# Patient Record
Sex: FEMALE | Race: WHITE | NOT HISPANIC OR LATINO
[De-identification: names, ages, dates, MRNs, and addresses within clinical notes are randomized per-mention and may not be internally consistent; named-entity substitution may affect disease eponyms.]

---

## 2022-03-24 PROBLEM — Z00.00 ENCOUNTER FOR PREVENTIVE HEALTH EXAMINATION: Status: ACTIVE | Noted: 2022-03-24

## 2022-04-01 ENCOUNTER — APPOINTMENT (OUTPATIENT)
Dept: CARDIOLOGY | Facility: CLINIC | Age: 77
End: 2022-04-01

## 2022-08-05 ENCOUNTER — RESULT REVIEW (OUTPATIENT)
Age: 77
End: 2022-08-05

## 2022-08-05 ENCOUNTER — OUTPATIENT (OUTPATIENT)
Dept: OUTPATIENT SERVICES | Facility: HOSPITAL | Age: 77
LOS: 1 days | End: 2022-08-05
Payer: MEDICARE

## 2022-08-05 ENCOUNTER — APPOINTMENT (OUTPATIENT)
Dept: ORTHOPEDIC SURGERY | Facility: CLINIC | Age: 77
End: 2022-08-05

## 2022-08-05 VITALS
BODY MASS INDEX: 34.15 KG/M2 | HEIGHT: 64 IN | HEART RATE: 74 BPM | SYSTOLIC BLOOD PRESSURE: 161 MMHG | DIASTOLIC BLOOD PRESSURE: 91 MMHG | OXYGEN SATURATION: 98 % | WEIGHT: 200 LBS

## 2022-08-05 DIAGNOSIS — Z86.79 PERSONAL HISTORY OF OTHER DISEASES OF THE CIRCULATORY SYSTEM: ICD-10-CM

## 2022-08-05 DIAGNOSIS — Z86.39 PERSONAL HISTORY OF OTHER ENDOCRINE, NUTRITIONAL AND METABOLIC DISEASE: ICD-10-CM

## 2022-08-05 PROCEDURE — 73564 X-RAY EXAM KNEE 4 OR MORE: CPT | Mod: 26,50

## 2022-08-05 PROCEDURE — 72170 X-RAY EXAM OF PELVIS: CPT | Mod: 26

## 2022-08-05 PROCEDURE — 72170 X-RAY EXAM OF PELVIS: CPT

## 2022-08-05 PROCEDURE — 73564 X-RAY EXAM KNEE 4 OR MORE: CPT

## 2022-08-05 PROCEDURE — 99204 OFFICE O/P NEW MOD 45 MIN: CPT | Mod: 25

## 2022-08-05 PROCEDURE — 20610 DRAIN/INJ JOINT/BURSA W/O US: CPT

## 2022-08-05 RX ORDER — MELOXICAM 7.5 MG/1
7.5 TABLET ORAL DAILY
Qty: 30 | Refills: 2 | Status: ACTIVE | COMMUNITY
Start: 2022-08-05 | End: 1900-01-01

## 2022-08-08 PROBLEM — Z86.39 HISTORY OF HIGH CHOLESTEROL: Status: RESOLVED | Noted: 2022-08-05 | Resolved: 2022-08-08

## 2022-08-08 PROBLEM — Z86.79 HISTORY OF HYPERTENSION: Status: RESOLVED | Noted: 2022-08-05 | Resolved: 2022-08-08

## 2022-08-08 NOTE — DISCUSSION/SUMMARY
[de-identified] : 77 y/o female with bilateral knee osteoarthritis\par - She would benefit from staged bilateral total knee arthroplasty, starting with the more symptomatic right side first\par - We discussed the details of the procedure, the expected recovery period, and the expected outcome. We discussed the likelihood of satisfaction after complete recovery, and the potential causes of dissatisfaction. The importance of active patient participation in the rehabilitation protocol was emphasized, along with its influence on short and long-term outcomes. Specific risks of total knee replacement were discussed in detail. We discussed the risk of surgical site complications including but not limited to: surgical site infection, wound healing complications, bone fracture, tendon or ligament injury, neurovascular injury, hemorrhage, postoperative stiffness or instability, persistent pain and need for reoperation or manipulation under anesthesia. We discussed surgical blood loss and the possible need for blood transfusion. We discussed the risk of perioperative medical complications, including but not limited to catheter-associated urinary tract infection, venous thromboembolism and other cardiopulmonary complications. We discussed anesthetic options and the risk of anesthesia-related complications. We discussed implant fixation methods; my plan would be to use fully cemented fixation in this case. We discussed the variable need to resurface the patella; my plan would be to resurface in this case. We discussed the durability of prosthetic knees and limitations related to wear, osteolysis and loosening.  We discussed the role of the robot in helping to guide bone resections and measure alignment and soft tissue balance. All questions were answered the patient's satisfaction. Her daughter also had the opportunity to listen in and all her questions were likewise answered to her satisfaction.\par - After full discussion, she elected to defer surgery for now and instead opted toward conservative management for the time being\par - PT\par - HEP\par - Right knee aspiration and CSI administered today\par - Right knee lateral off  brace\par - Tylenol and meloxicam as needed\par - RTC 3mo, no new XRs needed

## 2022-08-08 NOTE — HISTORY OF PRESENT ILLNESS
[de-identified] : 8/5/22: 75 y/o Guinean speaking female presents for bilateral knee pain R>L. She endorses difficulty and pain mainly with bending the right knee. Takes ibuprofen and applies topical ointment as needed for pain. Patient ambulates with a walker and reports a walking limitation of less than 1 block before having to stop and rest. Patient lives with her  in an elevator complex. Her daughter is an IM physician in [place] and participated in the conversation via phone regarding her mothers care.\par \par Allergy: she has a charted history of allergy to amoxicillin but has no recollection of what or when the reaction was

## 2022-08-08 NOTE — PHYSICAL EXAM
[de-identified] : General appearance: well nourished and hydrated, pleasant, alert and oriented x 3, cooperative.  Obese habitus\par HEENT: normocephalic, EOM intact, wearing mask, external auditory canal clear.  \par Cardiovascular: no lower leg edema, no varicosities, dorsalis pedis pulses palpable and symmetric.  \par Lymphatics: no palpable lymphadenopathy, no lymphedema.  \par Neurologic: sensation is normal, no muscle weakness in upper or lower extremities, patella tendon reflexes present and symmetric.  \par Dermatologic: skin moist, warm, no rash.  \par Spine: cervical spine with normal lordosis and painless range of motion, thoracic spine with normal kyphosis and painless range of motion, lumbosacral spine with normal lordosis and painless range of motion.\par Gait: bilateral antalgic gait, bilateral valgus thrust\par \par Left knee: all fields negative/normal/unremarkable unless otherwise noted -- \par - Focal soft tissue swelling: \par - Ecchymosis: \par - Erythema: \par - Effusion: trace\par - Wounds:  proximal longitudinal anterolateral healed surgical incision\par - Alignment: valgus\par - Tenderness: \par - ROM: 0-130\par - Collateral laxity:  increase laxity to valgus with soft endpoint\par - Cruciate laxity: stable\par - Popliteal angle (degrees): \par - Quad strength: \par \par Right knee: all fields negative/normal/unremarkable unless otherwise noted --\par - Focal soft tissue swelling: \par - Ecchymosis: \par - Erythema: \par - Effusion: \par - Wounds: moderate\par - Alignment: valgus\par - Tenderness: \par - ROM: 0-90\par - Collateral laxity:  increase laxity to valgus with soft endpoint\par - Cruciate laxity: stable\par - Popliteal angle (degrees): \par - Quad strength:  [de-identified] : AP pelvis and 4 views of the bilateral knees (weightbearing AP, weightbearing Douglas, weightbearing lateral, and Sunrise) were interpreted by me and reviewed with the patient.\par \par Location of imaging: [facility]\par Date of exam: 8/5/22\par \par Pelvic alignment: normal. Lumbar scoliosis noted.\par \par Right hip -- \par Arthritis: none\par Deformity: none\par Osteonecrosis: none\par \par Left hip -- \par Arthritis: none\par Deformity: none\par Osteonecrosis: none\par \par Right knee -- \par Alignment: valgus\par Arthritis: tricompartmental worst lateral, KL4\par Patellar height: normal\par Patellar tracking: central\par \par Left knee -- \par Alignment: valgus\par Arthritis: tricompartmental worst lateral, KL4\par Patellar height: normal\par Patellar tracking: central

## 2022-08-08 NOTE — PROCEDURE
[de-identified] : Procedure: Knee joint aspiration and injection\par Laterality: right\par Indication: Osteoarthritis - discussed with patient\par Skin prep: alcohol and chlorhexidine\par Anesthesia: ethyl chloride spray\par Needle: 18-gauge\par Portal: superolateral\par Aspiration: 20cc normal appearing synovial fluid\par Injectate: 2cc of 2% lidocaine, 2cc of 0.5% bupivacaine, and 1cc of 40mg/mL triamcinolone\par Dressing: Band-aid\par Complications: None\par

## 2022-10-28 ENCOUNTER — APPOINTMENT (OUTPATIENT)
Dept: ORTHOPEDIC SURGERY | Facility: CLINIC | Age: 77
End: 2022-10-28

## 2022-10-28 VITALS
WEIGHT: 200 LBS | BODY MASS INDEX: 34.15 KG/M2 | SYSTOLIC BLOOD PRESSURE: 104 MMHG | HEIGHT: 64 IN | DIASTOLIC BLOOD PRESSURE: 73 MMHG | HEART RATE: 88 BPM | OXYGEN SATURATION: 99 %

## 2022-10-28 PROCEDURE — 99214 OFFICE O/P EST MOD 30 MIN: CPT | Mod: 25

## 2022-10-28 PROCEDURE — 20610 DRAIN/INJ JOINT/BURSA W/O US: CPT

## 2022-11-01 NOTE — REASON FOR VISIT
[Follow-Up Visit] : a follow-up visit for [Other: ____] : [unfilled] [Interpreters_IDNumber] : [id number] [Interpreters_FullName] : jona [[id number]] : [place]

## 2022-11-01 NOTE — DISCUSSION/SUMMARY
[de-identified] : 76 y/o female with bilateral knee osteoarthritis, right more symptomatic than left. \par - She is indicated at this time for right total knee arthroplasty with Maribeth robotic assistance and left knee corticosteroid injection. \par - We discussed the details of the procedure, the expected recovery period, and the expected outcome. We discussed the likelihood of satisfaction after complete recovery, and the potential causes of dissatisfaction. The importance of active patient participation in the rehabilitation protocol was emphasized, along with its influence on short and long-term outcomes. Specific risks of total knee replacement were discussed in detail. We discussed the risk of surgical site complications including but not limited to: surgical site infection, wound healing complications, bone fracture, tendon or ligament injury, neurovascular injury, hemorrhage, postoperative stiffness or instability, persistent pain and need for reoperation or manipulation under anesthesia. We discussed surgical blood loss and the possible need for blood transfusion. We discussed the risk of perioperative medical complications, including but not limited to catheter-associated urinary tract infection, venous thromboembolism and other cardiopulmonary complications. We discussed anesthetic options and the risk of anesthesia-related complications. We discussed implant fixation methods; my plan would be to use fully cemented fixation in this case. We discussed the variable need to resurface the patella; my plan would be to resurface in this case. We discussed the durability of prosthetic knees and limitations related to wear, osteolysis and loosening.  We discussed the role of the robot in helping to guide bone resections and measure alignment and soft tissue balance. All questions were answered the patient's satisfaction. The patient was given a copy of my preoperative packet with additional information about the procedure. I asked the patient to either call back or schedule a followup appointment for any additional questions or concerns regarding the procedure. \par - She will proceed to surgery at a convenient time, likely January 2023 per her request, with standard medical clearance. \par - We discussed that a cortisone injection would be given to the contralateral knee at the same time to improve her ability to tolerate PT. \par - She will also be referred to both sports medicine and physiatry per her request for evaluation of her right shoulder pain.

## 2022-11-01 NOTE — PROCEDURE
[de-identified] : MONOVIS INJECTION - LEFT KNEE JOINT \par EXP 2024-10-31\par LOT [id number]\par [id number]\par MAN - DEPUY SYNTHES \par \par Procedure: Knee joint aspiration and injection\par Laterality: left \par Indication: Osteoarthritis - discussed with patient\par Skin prep: alcohol and chlorhexidine\par Anesthesia: ethyl chloride spray\par Needle: 18-gauge\par Portal: superolateral\par Aspiration: 13 cc of normal appearing synovial fluid\par Injectate: MonoVisc \par Dressing: Band-aid\par Complications: None

## 2022-11-01 NOTE — END OF VISIT
[FreeTextEntry3] : All medical record entries made by the Scribe were at my, Dr. Pankaj Reece, direction and personally dictated by me on 10/28/2022. I have reviewed the chart and agree that the record accurately reflects my personal performance of the history, physical exam, assessment and plan. I have also personally directed, reviewed, and agreed with the chart.

## 2022-11-01 NOTE — PHYSICAL EXAM
[de-identified] : General appearance: well nourished and hydrated, pleasant, alert and oriented x 3, cooperative.  Obese habitus\par HEENT: normocephalic, EOM intact, wearing mask, external auditory canal clear.  \par Cardiovascular: no lower leg edema, no varicosities, dorsalis pedis pulses palpable and symmetric.  \par Lymphatics: no palpable lymphadenopathy, no lymphedema.  \par Neurologic: sensation is normal, no muscle weakness in upper or lower extremities, patella tendon reflexes present and symmetric.  \par Dermatologic: skin moist, warm, no rash.  \par Spine: cervical spine with normal lordosis and painless range of motion, thoracic spine with normal kyphosis and painless range of motion, lumbosacral spine with normal lordosis and painless range of motion.\par Gait: bilateral antalgic gait, bilateral valgus thrust\par \par Left knee: all fields negative/normal/unremarkable unless otherwise noted -- \par - Focal soft tissue swelling: \par - Ecchymosis: \par - Erythema: \par - Effusion: trace\par - Wounds:  proximal longitudinal anterolateral healed surgical incision\par - Alignment: valgus\par - Tenderness: \par - ROM: 0-130\par - Collateral laxity:  increase laxity to valgus with soft endpoint\par - Cruciate laxity: stable\par - Popliteal angle (degrees): \par - Quad strength: \par \par Right knee: all fields negative/normal/unremarkable unless otherwise noted --\par - Focal soft tissue swelling: \par - Ecchymosis: \par - Erythema: \par - Effusion: \par - Wounds: moderate\par - Alignment: valgus\par - Tenderness: \par - ROM: 0-90\par - Collateral laxity:  increase laxity to valgus with soft endpoint\par - Cruciate laxity: stable\par - Popliteal angle (degrees): \par - Quad strength:

## 2022-11-01 NOTE — HISTORY OF PRESENT ILLNESS
[de-identified] : 10/28/2022: 78 y/o Qatari speaking female f/u for b/l OA. She reports good relief in her symptoms following the right knee aspiration and CSI last visit and she has been participating in PT and home exercise which is mostly water aerobics. However, she does feel that the right knee pain has been continuing to be somewhat of an issue and has decided that she would like to move forward with right total knee arthroplasty at some point in January 2023. 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 She notes that her left knee pain has increased since our last visit together and she would like to consider some injection treatments for the left knee as well. \par \par 8/5/22: 77 y/o Qatari speaking female presents for bilateral knee pain R>L. She endorses difficulty and pain mainly with bending the right knee. Takes ibuprofen and applies topical ointment as needed for pain. Patient ambulates with a walker and reports a walking limitation of less than 1 block before having to stop and rest. Patient lives with her  in an elevator complex. Her daughter is an IM physician in [place] and participated in the conversation via phone regarding her mothers care.\par \par Allergy: she has a charted history of allergy to amoxicillin but has no recollection of what or when the reaction was

## 2022-11-28 ENCOUNTER — APPOINTMENT (OUTPATIENT)
Dept: ORTHOPEDIC SURGERY | Facility: CLINIC | Age: 77
End: 2022-11-28
Payer: MEDICARE

## 2022-11-28 VITALS
WEIGHT: 184.37 LBS | HEART RATE: 73 BPM | OXYGEN SATURATION: 95 % | SYSTOLIC BLOOD PRESSURE: 119 MMHG | HEIGHT: 64 IN | BODY MASS INDEX: 31.48 KG/M2 | DIASTOLIC BLOOD PRESSURE: 81 MMHG

## 2022-11-28 PROCEDURE — 99204 OFFICE O/P NEW MOD 45 MIN: CPT | Mod: 25

## 2022-11-28 PROCEDURE — 20610 DRAIN/INJ JOINT/BURSA W/O US: CPT | Mod: RT

## 2022-11-28 PROCEDURE — 73030 X-RAY EXAM OF SHOULDER: CPT | Mod: RT

## 2022-11-28 PROCEDURE — 99214 OFFICE O/P EST MOD 30 MIN: CPT | Mod: 25

## 2022-11-28 NOTE — PHYSICAL EXAM
[de-identified] : Right shoulder AROM FF 90 ER 45 IR L1\par SS 4/5 IS 5/5 to isometric testing \par  [de-identified] : 4 views of right shoulder show no head elevation but defintis moderate GH OA with flattening of humeral head.

## 2022-11-28 NOTE — DISCUSSION/SUMMARY
[de-identified] : 77 year old English female ( grand nephew is Dr Juan Morales) Daughter is also a doctor Dr Jenifer Rosales. \par SHe comes with progressive increase in shoulder pain and decrease range of motion. \par SHe has had an injection 2 years ago and PT but no recent treatment. \par SHe is about to have a TKA in January. \par Mrs. Villalobos has a small RC tear but mostly [place] OA.\par Ultimately she may need RTSA however at first as she has a tka I will give her cortisone and do PT to prepare her to use the walker. \par AFter rehab for the knee  we can discuss ultimate treatment for the right shoulder. \par  I will call her Physician relatives to discuss.

## 2022-11-28 NOTE — HISTORY OF PRESENT ILLNESS
[de-identified] : 76 yo F initial visit for right shoulder pain. 2 years ago she fell in [place] and was treated by a physician out there with a cortisone injection, PT and MRI. The MRI showed a small tear. The PT and swimming was helping the shoulder pain until a few weeks ago when she started having increased pain and decreased ROM. She restarted PT 2 weeks ago.  She has not had any recent trauma or treatment for the shoulder. She is schedule for a right TKA in January.

## 2022-11-28 NOTE — PROCEDURE
[de-identified] : The right shoulder was prepped with alchohol and ethyl chloride was used to numb the skin. A 6 cc injection with 3 cc xylocaine, 2 cc sensoricaine and 1 cc kenalog , was given without complication into the Sub acromial spaceand GH space. . Patient instructed that there may be an inflammatory flare for 24 hrs , to use ice or advil if needed\par

## 2023-01-17 RX ORDER — ACETAMINOPHEN 500 MG/1
500 TABLET ORAL
Qty: 90 | Refills: 1 | Status: ACTIVE | COMMUNITY
Start: 2023-01-17 | End: 1900-01-01

## 2023-01-17 RX ORDER — OXYCODONE 5 MG/1
5 TABLET ORAL
Qty: 50 | Refills: 0 | Status: ACTIVE | COMMUNITY
Start: 2023-01-17 | End: 1900-01-01

## 2023-01-17 RX ORDER — PANTOPRAZOLE 40 MG/1
40 TABLET, DELAYED RELEASE ORAL DAILY
Qty: 30 | Refills: 2 | Status: ACTIVE | COMMUNITY
Start: 2023-01-17 | End: 1900-01-01

## 2023-01-17 RX ORDER — ASPIRIN 81 MG/1
81 TABLET ORAL
Qty: 60 | Refills: 0 | Status: ACTIVE | COMMUNITY
Start: 2023-01-17 | End: 1900-01-01

## 2023-01-17 RX ORDER — CELECOXIB 200 MG/1
200 CAPSULE ORAL TWICE DAILY
Qty: 60 | Refills: 2 | Status: ACTIVE | COMMUNITY
Start: 2023-01-17 | End: 1900-01-01

## 2023-01-18 VITALS
HEART RATE: 70 BPM | TEMPERATURE: 97 F | WEIGHT: 177.25 LBS | RESPIRATION RATE: 16 BRPM | SYSTOLIC BLOOD PRESSURE: 134 MMHG | HEIGHT: 59 IN | OXYGEN SATURATION: 96 % | DIASTOLIC BLOOD PRESSURE: 87 MMHG

## 2023-01-18 RX ORDER — POVIDONE-IODINE 5 %
1 AEROSOL (ML) TOPICAL ONCE
Refills: 0 | Status: COMPLETED | OUTPATIENT
Start: 2023-01-19 | End: 2023-01-19

## 2023-01-18 NOTE — H&P ADULT - HISTORY OF PRESENT ILLNESS
77F with right knee pain x    Presents for elective R TKR. 77F with right knee pain x years without specific accident or injury to bring on pain. Pt attributes pain to frequent falls. She ambulates with a cane intermittently at baseline. Pain persists despite conservative measures including PT. Denies numbness, tingling, paresthesias to BLE.  Presents for elective R TKR.

## 2023-01-18 NOTE — H&P ADULT - PROBLEM SELECTOR PLAN 1
Admit to Orthopedic Service for elective right TKR    Medically cleared and optimized for surgery by Dr. Saxena

## 2023-01-18 NOTE — H&P ADULT - NSHPPHYSICALEXAM_GEN_ALL_CORE
Gen: 76 y/o, NAD  MSK: Decreased right knee ROM secondary to pain      Rest of PE per MD clearance Gen: 76 y/o, NAD  MSK: Decreased right knee ROM secondary to pain  MSK: No deformity or open wounds. No rashes or lesions. EHL/TA/GS/FHL 5/5 BLE. Sensation intact and equal BLE. Skin warm and well perfused. DP palpable BLE. Cap refill brisk. Negative floyd bilaterally.   Rest of PE per MD clearance

## 2023-01-18 NOTE — ASU PATIENT PROFILE, ADULT - FALL HARM RISK - HARM RISK INTERVENTIONS

## 2023-01-18 NOTE — ASU PATIENT PROFILE, ADULT - NSICDXPASTMEDICALHX_GEN_ALL_CORE_FT
PAST MEDICAL HISTORY:  Anxiety and depression     DM (diabetes mellitus)     GERD (gastroesophageal reflux disease)     HLD (hyperlipidemia)     HTN (hypertension)     Osteoarthritis

## 2023-01-18 NOTE — ASU PATIENT PROFILE, ADULT - NSICDXPASTSURGICALHX_GEN_ALL_CORE_FT
PAST SURGICAL HISTORY:  History of fibula fracture ORIF left     PAST SURGICAL HISTORY:  H/O colonoscopy     History of surgery left fibula    History of surgery right shoulder lipoma    History of surgery pregnancy temination    S/P breast lumpectomy right

## 2023-01-18 NOTE — H&P ADULT - NSHPLABSRESULTS_GEN_ALL_CORE
Preop CBC, BMP, PT/INR, PTT within normal range and reviewed per medical clearance  Cr- .67  a1c: 5.9  UA: + leuks; culture growing proteus mirabilis  Preop CXR within normal limits and reviewed per medical clearance   Preop EKG within normal limits and reviewed per medical clearance  3M: OBDULIO  COVID: Preop CBC, BMP, PT/INR, PTT within normal range and reviewed per medical clearance  Cr- .67  a1c: 5.9  UA: + leuks; culture growing proteus mirabilis  Preop CXR within normal limits and reviewed per medical clearance   Preop EKG within normal limits and reviewed per medical clearance  BATSHEVA: OBDULIO  COVID: neg 1/17

## 2023-01-19 ENCOUNTER — APPOINTMENT (OUTPATIENT)
Dept: ORTHOPEDIC SURGERY | Facility: HOSPITAL | Age: 78
End: 2023-01-19

## 2023-01-19 ENCOUNTER — INPATIENT (INPATIENT)
Facility: HOSPITAL | Age: 78
LOS: 1 days | Discharge: NURSING, REHAB OR LONG-TERM CARE | DRG: 470 | End: 2023-01-21
Attending: ORTHOPAEDIC SURGERY | Admitting: ORTHOPAEDIC SURGERY
Payer: MEDICARE

## 2023-01-19 ENCOUNTER — TRANSCRIPTION ENCOUNTER (OUTPATIENT)
Age: 78
End: 2023-01-19

## 2023-01-19 ENCOUNTER — RESULT REVIEW (OUTPATIENT)
Age: 78
End: 2023-01-19

## 2023-01-19 DIAGNOSIS — E11.9 TYPE 2 DIABETES MELLITUS WITHOUT COMPLICATIONS: ICD-10-CM

## 2023-01-19 DIAGNOSIS — Z98.890 OTHER SPECIFIED POSTPROCEDURAL STATES: Chronic | ICD-10-CM

## 2023-01-19 DIAGNOSIS — E78.5 HYPERLIPIDEMIA, UNSPECIFIED: ICD-10-CM

## 2023-01-19 DIAGNOSIS — K21.9 GASTRO-ESOPHAGEAL REFLUX DISEASE WITHOUT ESOPHAGITIS: ICD-10-CM

## 2023-01-19 DIAGNOSIS — I10 ESSENTIAL (PRIMARY) HYPERTENSION: ICD-10-CM

## 2023-01-19 DIAGNOSIS — M17.11 UNILATERAL PRIMARY OSTEOARTHRITIS, RIGHT KNEE: ICD-10-CM

## 2023-01-19 DIAGNOSIS — F41.9 ANXIETY DISORDER, UNSPECIFIED: ICD-10-CM

## 2023-01-19 LAB
GLUCOSE BLDC GLUCOMTR-MCNC: 85 MG/DL — SIGNIFICANT CHANGE UP (ref 70–99)
GLUCOSE BLDC GLUCOMTR-MCNC: 99 MG/DL — SIGNIFICANT CHANGE UP (ref 70–99)

## 2023-01-19 PROCEDURE — S2900 ROBOTIC SURGICAL SYSTEM: CPT | Mod: NC

## 2023-01-19 PROCEDURE — 27447 TOTAL KNEE ARTHROPLASTY: CPT | Mod: RT

## 2023-01-19 PROCEDURE — 73560 X-RAY EXAM OF KNEE 1 OR 2: CPT | Mod: 26,RT

## 2023-01-19 DEVICE — ZIMMER/NEXGEN SMOOTH PIN 3.2X75MM: Type: IMPLANTABLE DEVICE | Site: RIGHT TOTAL KNEE REPLACEMENT | Status: FUNCTIONAL

## 2023-01-19 DEVICE — PIN CAS FIX 3.2X150MM: Type: IMPLANTABLE DEVICE | Site: RIGHT TOTAL KNEE REPLACEMENT | Status: FUNCTIONAL

## 2023-01-19 DEVICE — TIB PSN NP STM 5 DEG SZ DR: Type: IMPLANTABLE DEVICE | Site: RIGHT TOTAL KNEE REPLACEMENT | Status: FUNCTIONAL

## 2023-01-19 DEVICE — ZIMMER FEMALE HEX SCREW MAGNETIC 2.5MM X 25MM: Type: IMPLANTABLE DEVICE | Site: RIGHT TOTAL KNEE REPLACEMENT | Status: FUNCTIONAL

## 2023-01-19 DEVICE — STEM EXT PERSONA 14MM PLUS 30M: Type: IMPLANTABLE DEVICE | Site: RIGHT TOTAL KNEE REPLACEMENT | Status: FUNCTIONAL

## 2023-01-19 DEVICE — CEMENT PALACOS R: Type: IMPLANTABLE DEVICE | Site: RIGHT TOTAL KNEE REPLACEMENT | Status: FUNCTIONAL

## 2023-01-19 DEVICE — PIN FIX CAS 3.2X80MM STR: Type: IMPLANTABLE DEVICE | Site: RIGHT TOTAL KNEE REPLACEMENT | Status: FUNCTIONAL

## 2023-01-19 DEVICE — FEM PERSONA CEMENT NARROW SZ 8 R: Type: IMPLANTABLE DEVICE | Site: RIGHT TOTAL KNEE REPLACEMENT | Status: FUNCTIONAL

## 2023-01-19 DEVICE — ZIMMER/NEXGEN HEX HEAD SCREW 3.5MM: Type: IMPLANTABLE DEVICE | Site: RIGHT TOTAL KNEE REPLACEMENT | Status: FUNCTIONAL

## 2023-01-19 DEVICE — SURF ART PERSONA RT 6-9 CD PS 12MM: Type: IMPLANTABLE DEVICE | Site: RIGHT TOTAL KNEE REPLACEMENT | Status: FUNCTIONAL

## 2023-01-19 RX ORDER — CELECOXIB 200 MG/1
200 CAPSULE ORAL EVERY 12 HOURS
Refills: 0 | Status: DISCONTINUED | OUTPATIENT
Start: 2023-01-20 | End: 2023-01-21

## 2023-01-19 RX ORDER — HYDROMORPHONE HYDROCHLORIDE 2 MG/ML
0.5 INJECTION INTRAMUSCULAR; INTRAVENOUS; SUBCUTANEOUS EVERY 4 HOURS
Refills: 0 | Status: DISCONTINUED | OUTPATIENT
Start: 2023-01-19 | End: 2023-01-21

## 2023-01-19 RX ORDER — CHLORHEXIDINE GLUCONATE 213 G/1000ML
1 SOLUTION TOPICAL ONCE
Refills: 0 | Status: COMPLETED | OUTPATIENT
Start: 2023-01-19 | End: 2023-01-19

## 2023-01-19 RX ORDER — LOSARTAN POTASSIUM 100 MG/1
100 TABLET, FILM COATED ORAL DAILY
Refills: 0 | Status: DISCONTINUED | OUTPATIENT
Start: 2023-01-20 | End: 2023-01-21

## 2023-01-19 RX ORDER — BENZOCAINE AND MENTHOL 5; 1 G/100ML; G/100ML
1 LIQUID ORAL
Refills: 0 | Status: DISCONTINUED | OUTPATIENT
Start: 2023-01-19 | End: 2023-01-21

## 2023-01-19 RX ORDER — SODIUM CHLORIDE 9 MG/ML
1000 INJECTION, SOLUTION INTRAVENOUS
Refills: 0 | Status: DISCONTINUED | OUTPATIENT
Start: 2023-01-20 | End: 2023-01-21

## 2023-01-19 RX ORDER — KETOROLAC TROMETHAMINE 30 MG/ML
15 SYRINGE (ML) INJECTION EVERY 6 HOURS
Refills: 0 | Status: DISCONTINUED | OUTPATIENT
Start: 2023-01-19 | End: 2023-01-20

## 2023-01-19 RX ORDER — POLYETHYLENE GLYCOL 3350 17 G/17G
17 POWDER, FOR SOLUTION ORAL AT BEDTIME
Refills: 0 | Status: DISCONTINUED | OUTPATIENT
Start: 2023-01-19 | End: 2023-01-21

## 2023-01-19 RX ORDER — APREPITANT 80 MG/1
40 CAPSULE ORAL ONCE
Refills: 0 | Status: COMPLETED | OUTPATIENT
Start: 2023-01-19 | End: 2023-01-19

## 2023-01-19 RX ORDER — ONDANSETRON 8 MG/1
4 TABLET, FILM COATED ORAL EVERY 6 HOURS
Refills: 0 | Status: DISCONTINUED | OUTPATIENT
Start: 2023-01-19 | End: 2023-01-21

## 2023-01-19 RX ORDER — ESCITALOPRAM OXALATE 10 MG/1
10 TABLET, FILM COATED ORAL DAILY
Refills: 0 | Status: DISCONTINUED | OUTPATIENT
Start: 2023-01-19 | End: 2023-01-21

## 2023-01-19 RX ORDER — METFORMIN HYDROCHLORIDE 850 MG/1
1 TABLET ORAL
Qty: 0 | Refills: 0 | DISCHARGE

## 2023-01-19 RX ORDER — LANOLIN ALCOHOL/MO/W.PET/CERES
5 CREAM (GRAM) TOPICAL AT BEDTIME
Refills: 0 | Status: DISCONTINUED | OUTPATIENT
Start: 2023-01-19 | End: 2023-01-21

## 2023-01-19 RX ORDER — ASPIRIN/CALCIUM CARB/MAGNESIUM 324 MG
81 TABLET ORAL
Refills: 0 | Status: DISCONTINUED | OUTPATIENT
Start: 2023-01-20 | End: 2023-01-21

## 2023-01-19 RX ORDER — OXYCODONE HYDROCHLORIDE 5 MG/1
5 TABLET ORAL EVERY 4 HOURS
Refills: 0 | Status: DISCONTINUED | OUTPATIENT
Start: 2023-01-19 | End: 2023-01-21

## 2023-01-19 RX ORDER — ACETAMINOPHEN 500 MG
1000 TABLET ORAL ONCE
Refills: 0 | Status: COMPLETED | OUTPATIENT
Start: 2023-01-19 | End: 2023-01-19

## 2023-01-19 RX ORDER — OXYCODONE HYDROCHLORIDE 5 MG/1
10 TABLET ORAL EVERY 4 HOURS
Refills: 0 | Status: DISCONTINUED | OUTPATIENT
Start: 2023-01-19 | End: 2023-01-21

## 2023-01-19 RX ORDER — CEFAZOLIN SODIUM 1 G
2000 VIAL (EA) INJECTION EVERY 8 HOURS
Refills: 0 | Status: COMPLETED | OUTPATIENT
Start: 2023-01-19 | End: 2023-01-20

## 2023-01-19 RX ORDER — LINACLOTIDE 145 UG/1
1 CAPSULE, GELATIN COATED ORAL
Qty: 0 | Refills: 0 | DISCHARGE

## 2023-01-19 RX ORDER — SENNA PLUS 8.6 MG/1
2 TABLET ORAL AT BEDTIME
Refills: 0 | Status: DISCONTINUED | OUTPATIENT
Start: 2023-01-19 | End: 2023-01-21

## 2023-01-19 RX ORDER — PROPRANOLOL HCL 160 MG
160 CAPSULE, EXTENDED RELEASE 24HR ORAL DAILY
Refills: 0 | Status: DISCONTINUED | OUTPATIENT
Start: 2023-01-19 | End: 2023-01-21

## 2023-01-19 RX ORDER — TRAMADOL HYDROCHLORIDE 50 MG/1
50 TABLET ORAL EVERY 6 HOURS
Refills: 0 | Status: DISCONTINUED | OUTPATIENT
Start: 2023-01-19 | End: 2023-01-21

## 2023-01-19 RX ORDER — PANTOPRAZOLE SODIUM 20 MG/1
40 TABLET, DELAYED RELEASE ORAL
Refills: 0 | Status: DISCONTINUED | OUTPATIENT
Start: 2023-01-19 | End: 2023-01-21

## 2023-01-19 RX ORDER — ZOLPIDEM TARTRATE 10 MG/1
5 TABLET ORAL AT BEDTIME
Refills: 0 | Status: DISCONTINUED | OUTPATIENT
Start: 2023-01-19 | End: 2023-01-19

## 2023-01-19 RX ORDER — MAGNESIUM HYDROXIDE 400 MG/1
30 TABLET, CHEWABLE ORAL DAILY
Refills: 0 | Status: DISCONTINUED | OUTPATIENT
Start: 2023-01-19 | End: 2023-01-21

## 2023-01-19 RX ORDER — CELECOXIB 200 MG/1
200 CAPSULE ORAL ONCE
Refills: 0 | Status: COMPLETED | OUTPATIENT
Start: 2023-01-19 | End: 2023-01-19

## 2023-01-19 RX ORDER — ACETAMINOPHEN 500 MG
975 TABLET ORAL EVERY 8 HOURS
Refills: 0 | Status: DISCONTINUED | OUTPATIENT
Start: 2023-01-19 | End: 2023-01-21

## 2023-01-19 RX ORDER — TRAMADOL HYDROCHLORIDE 50 MG/1
1 TABLET ORAL
Qty: 0 | Refills: 0 | DISCHARGE

## 2023-01-19 RX ORDER — HYDROMORPHONE HYDROCHLORIDE 2 MG/ML
0.5 INJECTION INTRAMUSCULAR; INTRAVENOUS; SUBCUTANEOUS
Refills: 0 | Status: DISCONTINUED | OUTPATIENT
Start: 2023-01-19 | End: 2023-01-21

## 2023-01-19 RX ADMIN — CELECOXIB 200 MILLIGRAM(S): 200 CAPSULE ORAL at 09:05

## 2023-01-19 RX ADMIN — Medication 975 MILLIGRAM(S): at 22:58

## 2023-01-19 RX ADMIN — POLYETHYLENE GLYCOL 3350 17 GRAM(S): 17 POWDER, FOR SOLUTION ORAL at 21:58

## 2023-01-19 RX ADMIN — Medication 15 MILLIGRAM(S): at 23:29

## 2023-01-19 RX ADMIN — Medication 100 MILLIGRAM(S): at 19:22

## 2023-01-19 RX ADMIN — Medication 15 MILLIGRAM(S): at 18:01

## 2023-01-19 RX ADMIN — Medication 15 MILLIGRAM(S): at 23:44

## 2023-01-19 RX ADMIN — Medication 975 MILLIGRAM(S): at 21:58

## 2023-01-19 RX ADMIN — Medication 1000 MILLIGRAM(S): at 09:05

## 2023-01-19 RX ADMIN — APREPITANT 40 MILLIGRAM(S): 80 CAPSULE ORAL at 09:04

## 2023-01-19 RX ADMIN — CHLORHEXIDINE GLUCONATE 1 APPLICATION(S): 213 SOLUTION TOPICAL at 09:38

## 2023-01-19 RX ADMIN — Medication 1 APPLICATION(S): at 09:38

## 2023-01-19 NOTE — PHYSICAL THERAPY INITIAL EVALUATION ADULT - ADDITIONAL COMMENTS
Patient lives with spouse in apartment with 10 steps to enter. Spouse unable to assist - 'wheelchair-bound'. Does not use any Assistive Devices at baseline. Has been using shopping cart outdoors. Owns rollator and shower chair. Reports at least 2 recent falls for past 6 months

## 2023-01-19 NOTE — PHYSICAL THERAPY INITIAL EVALUATION ADULT - GENERAL OBSERVATIONS, REHAB EVAL
Patient received semi-helton in bed  in NAD on RA, +SCDs, +Heplock, +PACU Telemetry, +Cryocuff. Cleared by YVONNE Mittal. Agreeable to PT.

## 2023-01-19 NOTE — PRE-ANESTHESIA EVALUATION ADULT - NSANTHADDINFOFT_GEN_ALL_CORE
I discussed the anesthesia plan with the patient and her daughter, spinal+peripheral nerve block+sedation as the primary plan. I also discussed the possibility of general anesthesia in case of difficulty with spinal placement.  Audio  was used during this interview and the informed consent.

## 2023-01-19 NOTE — PRE-ANESTHESIA EVALUATION ADULT - NSANTHPMHFT_GEN_ALL_CORE
The patient is a 78yo obese female with HTN, HL, GERD, anxiety and OA presents for Right TKR. Her activity is limited due to her joint pain and weight, overall inactive life style but she is able to walk without assistance. She denies any cardiac history, chest pain, difficulty breathing. She reports that her GERD is controlled under medication and it mostly depends on what she eats.

## 2023-01-20 ENCOUNTER — TRANSCRIPTION ENCOUNTER (OUTPATIENT)
Age: 78
End: 2023-01-20

## 2023-01-20 LAB
A1C WITH ESTIMATED AVERAGE GLUCOSE RESULT: 5.7 % — HIGH (ref 4–5.6)
ANION GAP SERPL CALC-SCNC: 9 MMOL/L — SIGNIFICANT CHANGE UP (ref 5–17)
BUN SERPL-MCNC: 29 MG/DL — HIGH (ref 7–23)
CALCIUM SERPL-MCNC: 9.2 MG/DL — SIGNIFICANT CHANGE UP (ref 8.4–10.5)
CHLORIDE SERPL-SCNC: 101 MMOL/L — SIGNIFICANT CHANGE UP (ref 96–108)
CO2 SERPL-SCNC: 26 MMOL/L — SIGNIFICANT CHANGE UP (ref 22–31)
CREAT SERPL-MCNC: 0.61 MG/DL — SIGNIFICANT CHANGE UP (ref 0.5–1.3)
EGFR: 93 ML/MIN/1.73M2 — SIGNIFICANT CHANGE UP
ESTIMATED AVERAGE GLUCOSE: 117 MG/DL — HIGH (ref 68–114)
GLUCOSE BLDC GLUCOMTR-MCNC: 123 MG/DL — HIGH (ref 70–99)
GLUCOSE BLDC GLUCOMTR-MCNC: 124 MG/DL — HIGH (ref 70–99)
GLUCOSE BLDC GLUCOMTR-MCNC: 137 MG/DL — HIGH (ref 70–99)
GLUCOSE BLDC GLUCOMTR-MCNC: 139 MG/DL — HIGH (ref 70–99)
GLUCOSE SERPL-MCNC: 146 MG/DL — HIGH (ref 70–99)
HCT VFR BLD CALC: 35.5 % — SIGNIFICANT CHANGE UP (ref 34.5–45)
HCV AB S/CO SERPL IA: 0.04 S/CO — SIGNIFICANT CHANGE UP
HCV AB SERPL-IMP: SIGNIFICANT CHANGE UP
HGB BLD-MCNC: 11.8 G/DL — SIGNIFICANT CHANGE UP (ref 11.5–15.5)
MCHC RBC-ENTMCNC: 29.1 PG — SIGNIFICANT CHANGE UP (ref 27–34)
MCHC RBC-ENTMCNC: 33.2 GM/DL — SIGNIFICANT CHANGE UP (ref 32–36)
MCV RBC AUTO: 87.7 FL — SIGNIFICANT CHANGE UP (ref 80–100)
NRBC # BLD: 0 /100 WBCS — SIGNIFICANT CHANGE UP (ref 0–0)
PLATELET # BLD AUTO: 304 K/UL — SIGNIFICANT CHANGE UP (ref 150–400)
POTASSIUM SERPL-MCNC: 3.8 MMOL/L — SIGNIFICANT CHANGE UP (ref 3.5–5.3)
POTASSIUM SERPL-SCNC: 3.8 MMOL/L — SIGNIFICANT CHANGE UP (ref 3.5–5.3)
RBC # BLD: 4.05 M/UL — SIGNIFICANT CHANGE UP (ref 3.8–5.2)
RBC # FLD: 13.4 % — SIGNIFICANT CHANGE UP (ref 10.3–14.5)
SARS-COV-2 RNA SPEC QL NAA+PROBE: SIGNIFICANT CHANGE UP
SODIUM SERPL-SCNC: 136 MMOL/L — SIGNIFICANT CHANGE UP (ref 135–145)
WBC # BLD: 18.84 K/UL — HIGH (ref 3.8–10.5)
WBC # FLD AUTO: 18.84 K/UL — HIGH (ref 3.8–10.5)

## 2023-01-20 PROCEDURE — 99222 1ST HOSP IP/OBS MODERATE 55: CPT

## 2023-01-20 RX ORDER — ASPIRIN/CALCIUM CARB/MAGNESIUM 324 MG
1 TABLET ORAL
Qty: 0 | Refills: 0 | DISCHARGE

## 2023-01-20 RX ORDER — ASPIRIN/CALCIUM CARB/MAGNESIUM 324 MG
1 TABLET ORAL
Qty: 0 | Refills: 0 | DISCHARGE
Start: 2023-01-20

## 2023-01-20 RX ORDER — OXYCODONE HYDROCHLORIDE 5 MG/1
1 TABLET ORAL
Qty: 0 | Refills: 0 | DISCHARGE
Start: 2023-01-20

## 2023-01-20 RX ORDER — PANTOPRAZOLE SODIUM 20 MG/1
1 TABLET, DELAYED RELEASE ORAL
Qty: 0 | Refills: 0 | DISCHARGE
Start: 2023-01-20

## 2023-01-20 RX ORDER — GLUCAGON INJECTION, SOLUTION 0.5 MG/.1ML
1 INJECTION, SOLUTION SUBCUTANEOUS ONCE
Refills: 0 | Status: DISCONTINUED | OUTPATIENT
Start: 2023-01-20 | End: 2023-01-21

## 2023-01-20 RX ORDER — SODIUM CHLORIDE 9 MG/ML
1000 INJECTION, SOLUTION INTRAVENOUS
Refills: 0 | Status: DISCONTINUED | OUTPATIENT
Start: 2023-01-20 | End: 2023-01-21

## 2023-01-20 RX ORDER — ACETAMINOPHEN 500 MG
3 TABLET ORAL
Qty: 0 | Refills: 0 | DISCHARGE
Start: 2023-01-20

## 2023-01-20 RX ORDER — DEXTROSE 50 % IN WATER 50 %
15 SYRINGE (ML) INTRAVENOUS ONCE
Refills: 0 | Status: DISCONTINUED | OUTPATIENT
Start: 2023-01-20 | End: 2023-01-21

## 2023-01-20 RX ORDER — POLYETHYLENE GLYCOL 3350 17 G/17G
17 POWDER, FOR SOLUTION ORAL
Qty: 0 | Refills: 0 | DISCHARGE
Start: 2023-01-20

## 2023-01-20 RX ORDER — PANTOPRAZOLE SODIUM 20 MG/1
1 TABLET, DELAYED RELEASE ORAL
Qty: 0 | Refills: 0 | DISCHARGE

## 2023-01-20 RX ORDER — DEXTROSE 50 % IN WATER 50 %
12.5 SYRINGE (ML) INTRAVENOUS ONCE
Refills: 0 | Status: DISCONTINUED | OUTPATIENT
Start: 2023-01-20 | End: 2023-01-21

## 2023-01-20 RX ORDER — TRAMADOL HYDROCHLORIDE 50 MG/1
1 TABLET ORAL
Qty: 0 | Refills: 0 | DISCHARGE
Start: 2023-01-20

## 2023-01-20 RX ORDER — CELECOXIB 200 MG/1
1 CAPSULE ORAL
Qty: 0 | Refills: 0 | DISCHARGE
Start: 2023-01-20

## 2023-01-20 RX ORDER — INSULIN LISPRO 100/ML
VIAL (ML) SUBCUTANEOUS
Refills: 0 | Status: DISCONTINUED | OUTPATIENT
Start: 2023-01-20 | End: 2023-01-21

## 2023-01-20 RX ORDER — SENNA PLUS 8.6 MG/1
2 TABLET ORAL
Qty: 0 | Refills: 0 | DISCHARGE
Start: 2023-01-20

## 2023-01-20 RX ORDER — DEXTROSE 50 % IN WATER 50 %
25 SYRINGE (ML) INTRAVENOUS ONCE
Refills: 0 | Status: DISCONTINUED | OUTPATIENT
Start: 2023-01-20 | End: 2023-01-21

## 2023-01-20 RX ADMIN — Medication 1 TABLET(S): at 12:25

## 2023-01-20 RX ADMIN — Medication 975 MILLIGRAM(S): at 15:56

## 2023-01-20 RX ADMIN — Medication 15 MILLIGRAM(S): at 12:18

## 2023-01-20 RX ADMIN — Medication 15 MILLIGRAM(S): at 05:11

## 2023-01-20 RX ADMIN — Medication 975 MILLIGRAM(S): at 05:10

## 2023-01-20 RX ADMIN — SENNA PLUS 2 TABLET(S): 8.6 TABLET ORAL at 23:03

## 2023-01-20 RX ADMIN — Medication 15 MILLIGRAM(S): at 12:35

## 2023-01-20 RX ADMIN — POLYETHYLENE GLYCOL 3350 17 GRAM(S): 17 POWDER, FOR SOLUTION ORAL at 23:03

## 2023-01-20 RX ADMIN — PANTOPRAZOLE SODIUM 40 MILLIGRAM(S): 20 TABLET, DELAYED RELEASE ORAL at 05:10

## 2023-01-20 RX ADMIN — Medication 81 MILLIGRAM(S): at 17:09

## 2023-01-20 RX ADMIN — Medication 15 MILLIGRAM(S): at 05:26

## 2023-01-20 RX ADMIN — Medication 975 MILLIGRAM(S): at 14:56

## 2023-01-20 RX ADMIN — Medication 100 MILLIGRAM(S): at 02:35

## 2023-01-20 RX ADMIN — ESCITALOPRAM OXALATE 10 MILLIGRAM(S): 10 TABLET, FILM COATED ORAL at 12:19

## 2023-01-20 RX ADMIN — Medication 975 MILLIGRAM(S): at 06:10

## 2023-01-20 RX ADMIN — Medication 975 MILLIGRAM(S): at 23:03

## 2023-01-20 NOTE — DISCHARGE NOTE PROVIDER - HOSPITAL COURSE
Admitted to Orthopedic service Dr. Reece  Surgery on 1/19/23 - right total knee replacement   Amber-op Antibiotics  Pain control  DVT prophylaxis  OOB/Physical Therapy

## 2023-01-20 NOTE — CONSULT NOTE ADULT - ASSESSMENT
A/P:   1. elective R TKR POD 1 -- management per ortho  2. pre-diabetes -- FS within range, goal -180 and a1c 5.7; continue consistent carbohydrate diet.  3. essential HTN -- BP WNL, holding irbesartan 300mg po daily and HCTZ 12.5mg po daily. If systolic BP >130, may restart irbesartan 150mg po daily.  4. anxiety: continue lexapro 10mg po daily  5. insomnia: patient is NOT on ambien, rather per daughter who is an internist, she is on Zaleplon 5mg po nightly at home (nonformulary)  6. post operative state  7. class 1 obesity -- affects all aspects of care  8 reactive leukocytosis due to post op state -- continue to monitor.    plan for dc to [facility] vs [facility], has medicare

## 2023-01-20 NOTE — PATIENT PROFILE ADULT - FALL HARM RISK - HARM RISK INTERVENTIONS
AMD (WET), OD:  AVASTIN (1.25MG) INTRAVITREAL INJECTION. RETURN AS SCHEDULED. Communicate Risk of Fall with Harm to all staff/Reinforce activity limits and safety measures with patient and family/Tailored Fall Risk Interventions/Visual Cue: Yellow wristband and red socks/Bed in lowest position, wheels locked, appropriate side rails in place/Call bell, personal items and telephone in reach/Instruct patient to call for assistance before getting out of bed or chair/Non-slip footwear when patient is out of bed/[place] to call system/Physically safe environment - no spills, clutter or unnecessary equipment/Purposeful Proactive Rounding/Room/bathroom lighting operational, light cord in reach

## 2023-01-20 NOTE — DISCHARGE NOTE PROVIDER - CARE PROVIDER_API CALL
Pankaj Reece)  Orthopedics  [address] [facility], [place] [address]  Phone: [phone]  Fax: [phone]  Follow Up Time:

## 2023-01-20 NOTE — DISCHARGE NOTE PROVIDER - NSDCFUSCHEDAPPT_GEN_ALL_CORE_FT
Pankaj Reece  [facility]  ORTHOSURG [address]  Scheduled Appointment: 02/03/2023    Duncan Ross  [facility]  ORTHOSURG [address]  Scheduled Appointment: 02/27/2023

## 2023-01-20 NOTE — CONSULT NOTE ADULT - SUBJECTIVE AND OBJECTIVE BOX
`MEDICINE CO-MANAGEMENT CONSULT NOTE    HPI:  77F with right knee pain x years without specific accident or injury to bring on pain. Pt attributes pain to frequent falls. She ambulates with a cane intermittently at baseline. Pain persists despite conservative measures including PT. Denies numbness, tingling, paresthesias to BLE.  Presents for elective R TKR. (18 Jan 2023 11:47)    Interval Hx: s/p R TKR POD 1, reports mild pain that becomes moderate with movement; daughter by bedside.      PAST MEDICAL & SURGICAL HISTORY:  Osteoarthritis  HTN (hypertension)  HLD (hyperlipidemia)  GERD (gastroesophageal reflux disease)  Anxiety and depression  History of surgery  left fibula  S/P breast lumpectomy  right  History of surgery  right shoulder lipoma  History of surgery  pregnancy temination  H/O colonoscopy          Home Meds: Home Medications:  Zaleplon 5 mg oral tablet: 1 tab(s) orally once a day (at bedtime) (19 Jan 2023 09:24)  Aspir 81 oral delayed release tablet: 1 tab(s) orally once a day (19 Jan 2023 09:24)  escitalopram 10 mg oral tablet: 1 tab(s) orally once a day (19 Jan 2023 09:24)  hydroCHLOROthiazide 12.5 mg oral tablet: 1 tab(s) orally once a day (19 Jan 2023 16:48)  irbesartan-hydrochlorothiazide 300 mg-12.5 mg oral tablet: 1 tab(s) orally once a day (19 Jan 2023 09:24)  propranolol 160 mg oral capsule, extended release: 1 cap(s) orally once a day (19 Jan 2023 09:24)  Protonix 40 mg oral delayed release tablet: 1 tab(s) orally once a day, As Needed (19 Jan 2023 09:24)      FAMILY HISTORY:  T2DM in both parents    Social History:  occasional alcohol use  denies tobacco use    Allergies    amoxicillin (Blisters; Pruritus; Hives)    Intolerances        REVIEW OF SYSTEMS:    GENERAL: Denies fevers, chills, night sweats, weight loss    RESPIRATORY: Denies SOB, coughing, wheezing    CARDIOVASCULAR: Denies chest pain, palpitations, lower extremity edema    GASTROINTESTINAL: Denies abdominal pain, nausea, vomiting    NEUROLOGICAL: Denies facial drooping, slurred speech, vertigo, visual disturbances, unilateral weakness, numbness or tingling    MSK: R knee pain and R leg swelling    PHYSICAL EXAM:    T(C): 37.1 (01-20-23 @ 08:50), Max: 37.1 (01-20-23 @ 08:50)  HR: 61 (01-20-23 @ 08:50) (48 - 90)  BP: 101/66 (01-20-23 @ 08:50) (98/63 - 159/73)  RR: 16 (01-20-23 @ 08:50) (14 - 73)  SpO2: 94% (01-20-23 @ 08:50) (92% - 99%)  General: NAD  HENMT: +MMM, no LAD  RESPIRATORY: no increased WOB, CTAB  CVS: RRR, no m/r/g, extremities warm and well perfused, DP 2+ bilaterally  ABD: +BS, NT/ND  EXT: no pitting edema  Neuro: alert and oriented to person, place, time and situation. CN II-XII intact, 5/5 motor upper strength bilaterally. Sensation grossly equal and intact in upper and lower extremites, 5/5 LLE 1/5 RLE  MSK: some swelling of the R knee and leg, no pitting edema    Labs:  CAPILLARY BLOOD GLUCOSE      POCT Blood Glucose.: 123 mg/dL (20 Jan 2023 11:38)  POCT Blood Glucose.: 139 mg/dL (20 Jan 2023 07:11)  POCT Blood Glucose.: 99 mg/dL (19 Jan 2023 14:29)                          11.8   18.84 )-----------( 304      ( 20 Jan 2023 06:11 )             35.5         01-20    136  |  101  |  29<H>  ----------------------------<  146<H>  3.8   |  26  |  0.61      Calcium, Total Serum: 9.2 mg/dL (01-20-23 @ 06:11)      IMAGING  < from: Xray Knee 1 or 2 Views, Right (01.19.23 @ 13:41) >    IMPRESSION: Frontal and lateral views of the right kneedemonstrates   total arthroplasty in appropriate alignment. Expected air within the   joint.    --- End of Report ---          < end of copied text >

## 2023-01-20 NOTE — DISCHARGE NOTE PROVIDER - NSDCMRMEDTOKEN_GEN_ALL_CORE_FT
acetaminophen 325 mg oral tablet: 3 tab(s) orally every 8 hours  Ambien 5 mg oral tablet: 1 tab(s) orally once a day (at bedtime)  aspirin 81 mg oral delayed release tablet: 1 tab(s) orally 2 times a day x 30 days after surgery   celecoxib 200 mg oral capsule: 1 cap(s) orally every 12 hours  escitalopram 10 mg oral tablet: 1 tab(s) orally once a day  hydroCHLOROthiazide 12.5 mg oral tablet: 1 tab(s) orally once a day  irbesartan-hydrochlorothiazide 300 mg-12.5 mg oral tablet: 1 tab(s) orally once a day  oxyCODONE 10 mg oral tablet: 1 tab(s) orally every 4 hours, As needed, Severe Pain (7 - 10)  oxyCODONE 5 mg oral tablet: 1 tab(s) orally every 4 hours, As needed, Moderate Pain (4 - 6)  pantoprazole 40 mg oral delayed release tablet: 1 tab(s) orally once a day (before a meal)  polyethylene glycol 3350 oral powder for reconstitution: 17 gram(s) orally once a day (at bedtime)  propranolol 160 mg oral capsule, extended release: 1 cap(s) orally once a day  senna leaf extract oral tablet: 2 tab(s) orally once a day (at bedtime)  traMADol 50 mg oral tablet: 1 tab(s) orally every 6 hours, As needed, Mild Pain (1 - 3)   acetaminophen 325 mg oral tablet: 3 tab(s) orally every 8 hours  aspirin 81 mg oral delayed release tablet: 1 tab(s) orally 2 times a day x 30 days after surgery   bisacodyl 10 mg rectal suppository: 1 suppository(ies) rectal once, As needed, Constipation  celecoxib 200 mg oral capsule: 1 cap(s) orally every 12 hours  escitalopram 10 mg oral tablet: 1 tab(s) orally once a day  hydroCHLOROthiazide 12.5 mg oral tablet: 1 tab(s) orally once a day  irbesartan-hydrochlorothiazide 300 mg-12.5 mg oral tablet: 1 tab(s) orally once a day  melatonin 5 mg oral tablet: 1 tab(s) orally once a day (at bedtime), As needed, Sleep  Multiple Vitamins oral tablet: 1 tab(s) orally once a day  oxyCODONE 10 mg oral tablet: 1 tab(s) orally every 4 hours, As needed, Severe Pain (7 - 10)  oxyCODONE 5 mg oral tablet: 1 tab(s) orally every 4 hours, As needed, Moderate Pain (4 - 6)  pantoprazole 40 mg oral delayed release tablet: 1 tab(s) orally once a day (before a meal)  polyethylene glycol 3350 oral powder for reconstitution: 17 gram(s) orally once a day (at bedtime)  propranolol 160 mg oral capsule, extended release: 1 cap(s) orally once a day  senna leaf extract oral tablet: 2 tab(s) orally once a day (at bedtime)  traMADol 50 mg oral tablet: 1 tab(s) orally every 6 hours, As needed, Mild Pain (1 - 3)

## 2023-01-20 NOTE — DISCHARGE NOTE PROVIDER - NSDCCPCAREPLAN_GEN_ALL_CORE_FT
PRINCIPAL DISCHARGE DIAGNOSIS  Diagnosis: Osteoarthritis of right knee  Assessment and Plan of Treatment:        PRINCIPAL DISCHARGE DIAGNOSIS  Diagnosis: Osteoarthritis of right knee  Assessment and Plan of Treatment: s/p R TKR

## 2023-01-21 ENCOUNTER — TRANSCRIPTION ENCOUNTER (OUTPATIENT)
Age: 78
End: 2023-01-21

## 2023-01-21 VITALS
DIASTOLIC BLOOD PRESSURE: 80 MMHG | OXYGEN SATURATION: 94 % | RESPIRATION RATE: 17 BRPM | HEART RATE: 70 BPM | SYSTOLIC BLOOD PRESSURE: 122 MMHG

## 2023-01-21 LAB
ANION GAP SERPL CALC-SCNC: 5 MMOL/L — SIGNIFICANT CHANGE UP (ref 5–17)
BUN SERPL-MCNC: 19 MG/DL — SIGNIFICANT CHANGE UP (ref 7–23)
CALCIUM SERPL-MCNC: 9 MG/DL — SIGNIFICANT CHANGE UP (ref 8.4–10.5)
CHLORIDE SERPL-SCNC: 105 MMOL/L — SIGNIFICANT CHANGE UP (ref 96–108)
CO2 SERPL-SCNC: 28 MMOL/L — SIGNIFICANT CHANGE UP (ref 22–31)
CREAT SERPL-MCNC: 0.63 MG/DL — SIGNIFICANT CHANGE UP (ref 0.5–1.3)
EGFR: 92 ML/MIN/1.73M2 — SIGNIFICANT CHANGE UP
GLUCOSE BLDC GLUCOMTR-MCNC: 125 MG/DL — HIGH (ref 70–99)
GLUCOSE BLDC GLUCOMTR-MCNC: 87 MG/DL — SIGNIFICANT CHANGE UP (ref 70–99)
GLUCOSE BLDC GLUCOMTR-MCNC: 93 MG/DL — SIGNIFICANT CHANGE UP (ref 70–99)
GLUCOSE SERPL-MCNC: 93 MG/DL — SIGNIFICANT CHANGE UP (ref 70–99)
HCT VFR BLD CALC: 31.7 % — LOW (ref 34.5–45)
HGB BLD-MCNC: 10.4 G/DL — LOW (ref 11.5–15.5)
MCHC RBC-ENTMCNC: 28.9 PG — SIGNIFICANT CHANGE UP (ref 27–34)
MCHC RBC-ENTMCNC: 32.8 GM/DL — SIGNIFICANT CHANGE UP (ref 32–36)
MCV RBC AUTO: 88.1 FL — SIGNIFICANT CHANGE UP (ref 80–100)
NRBC # BLD: 0 /100 WBCS — SIGNIFICANT CHANGE UP (ref 0–0)
PLATELET # BLD AUTO: 243 K/UL — SIGNIFICANT CHANGE UP (ref 150–400)
POTASSIUM SERPL-MCNC: 4.2 MMOL/L — SIGNIFICANT CHANGE UP (ref 3.5–5.3)
POTASSIUM SERPL-SCNC: 4.2 MMOL/L — SIGNIFICANT CHANGE UP (ref 3.5–5.3)
RBC # BLD: 3.6 M/UL — LOW (ref 3.8–5.2)
RBC # FLD: 13.9 % — SIGNIFICANT CHANGE UP (ref 10.3–14.5)
SODIUM SERPL-SCNC: 138 MMOL/L — SIGNIFICANT CHANGE UP (ref 135–145)
WBC # BLD: 12.51 K/UL — HIGH (ref 3.8–10.5)
WBC # FLD AUTO: 12.51 K/UL — HIGH (ref 3.8–10.5)

## 2023-01-21 PROCEDURE — 85027 COMPLETE CBC AUTOMATED: CPT

## 2023-01-21 PROCEDURE — C1713: CPT

## 2023-01-21 PROCEDURE — 36415 COLL VENOUS BLD VENIPUNCTURE: CPT

## 2023-01-21 PROCEDURE — 82962 GLUCOSE BLOOD TEST: CPT

## 2023-01-21 PROCEDURE — C1776: CPT

## 2023-01-21 PROCEDURE — 97116 GAIT TRAINING THERAPY: CPT

## 2023-01-21 PROCEDURE — 73560 X-RAY EXAM OF KNEE 1 OR 2: CPT | Mod: 26,RT

## 2023-01-21 PROCEDURE — 80048 BASIC METABOLIC PNL TOTAL CA: CPT

## 2023-01-21 PROCEDURE — 86803 HEPATITIS C AB TEST: CPT

## 2023-01-21 PROCEDURE — 83036 HEMOGLOBIN GLYCOSYLATED A1C: CPT

## 2023-01-21 PROCEDURE — U0003: CPT

## 2023-01-21 PROCEDURE — S2900: CPT

## 2023-01-21 PROCEDURE — 99232 SBSQ HOSP IP/OBS MODERATE 35: CPT

## 2023-01-21 PROCEDURE — 73560 X-RAY EXAM OF KNEE 1 OR 2: CPT

## 2023-01-21 PROCEDURE — U0005: CPT

## 2023-01-21 PROCEDURE — 97161 PT EVAL LOW COMPLEX 20 MIN: CPT

## 2023-01-21 RX ORDER — ZOLPIDEM TARTRATE 10 MG/1
1 TABLET ORAL
Qty: 0 | Refills: 0 | DISCHARGE

## 2023-01-21 RX ORDER — IRBESARTAN 75 MG/1
1 TABLET ORAL
Qty: 0 | Refills: 0 | DISCHARGE

## 2023-01-21 RX ORDER — LANOLIN ALCOHOL/MO/W.PET/CERES
1 CREAM (GRAM) TOPICAL
Qty: 0 | Refills: 0 | DISCHARGE
Start: 2023-01-21

## 2023-01-21 RX ADMIN — CELECOXIB 200 MILLIGRAM(S): 200 CAPSULE ORAL at 05:20

## 2023-01-21 RX ADMIN — Medication 81 MILLIGRAM(S): at 16:33

## 2023-01-21 RX ADMIN — TRAMADOL HYDROCHLORIDE 50 MILLIGRAM(S): 50 TABLET ORAL at 11:04

## 2023-01-21 RX ADMIN — Medication 975 MILLIGRAM(S): at 05:21

## 2023-01-21 RX ADMIN — Medication 975 MILLIGRAM(S): at 00:03

## 2023-01-21 RX ADMIN — Medication 975 MILLIGRAM(S): at 15:37

## 2023-01-21 RX ADMIN — Medication 975 MILLIGRAM(S): at 06:21

## 2023-01-21 RX ADMIN — Medication 975 MILLIGRAM(S): at 14:35

## 2023-01-21 RX ADMIN — PANTOPRAZOLE SODIUM 40 MILLIGRAM(S): 20 TABLET, DELAYED RELEASE ORAL at 08:25

## 2023-01-21 RX ADMIN — Medication 81 MILLIGRAM(S): at 05:21

## 2023-01-21 RX ADMIN — TRAMADOL HYDROCHLORIDE 50 MILLIGRAM(S): 50 TABLET ORAL at 12:04

## 2023-01-21 RX ADMIN — CELECOXIB 200 MILLIGRAM(S): 200 CAPSULE ORAL at 16:33

## 2023-01-21 RX ADMIN — CELECOXIB 200 MILLIGRAM(S): 200 CAPSULE ORAL at 06:20

## 2023-01-21 RX ADMIN — CELECOXIB 200 MILLIGRAM(S): 200 CAPSULE ORAL at 17:37

## 2023-01-21 NOTE — PROGRESS NOTE ADULT - ASSESSMENT
A/P: 77yFemale s/p R TKA on 01/19  - Stable  - Pain/Nausea Control  - Home meds  - AM labs show stable CBC 01/21  - DVT ppx: ASA 81 BID  - WBS: WBAT RLE  - PT: rec'd [facility]  - Dispo: pending rehab placement       Ortho Pager [phone]
A/P: 77yFemale s/p R TKA on 01/19  - Stable  - Pain/Nausea Control  - Home meds  - AM labs show stable CBC 01/20  - DVT ppx: ASA 81 BID  - WBS: WBAT RLE  - PT: rec'd [facility]  - Dispo: pending rehab placement -- Patient very eager to be dispo'd to acute rehab      Ortho Pager [phone]

## 2023-01-21 NOTE — PROGRESS NOTE ADULT - SUBJECTIVE AND OBJECTIVE BOX
Ortho Note    Pt comfortable without complaints, pain controlled  Denies CP, SOB, N/V, numbness/tingling  Ambulated postop 01/19     Vital Signs Last 24 Hrs  T(C): 36.6 (20 Jan 2023 04:56), Max: 36.6 (20 Jan 2023 00:49)  T(F): 97.8 (20 Jan 2023 04:56), Max: 97.8 (20 Jan 2023 00:49)  HR: 71 (20 Jan 2023 04:56) (48 - 90)  BP: 104/69 (20 Jan 2023 04:56) (98/63 - 159/73)  BP(mean): 81 (20 Jan 2023 04:56) (75 - 83)  RR: 17 (20 Jan 2023 04:56) (14 - 73)  SpO2: 97% (20 Jan 2023 04:56) (92% - 100%)    Parameters below as of 20 Jan 2023 04:56  Patient On (Oxygen Delivery Method): room air              VSS  General: A&Ox3, NAD  RLE: Aquacell DSG C/D/I  Pulses: Foot WWP; DP pulse 2+; Cap refill < 2 sec  Sensation: SILT distally and symmetric to contralateral extremity  Motor: TA/EHL/FHL/GS 5/5 and symmetric to contralateral extremity                          11.8   18.84 )-----------( 304      ( 20 Jan 2023 06:11 )             35.5               
Orthopaedic Surgery Progress Note    Post-operative day #1 s/p right total knee replacement     Subjective:     Patient seen and examined. Patient comfortable without complaints, pain controlled.      Objective:    Vital Signs Last 24 Hrs  T(C): 37.1 (01-20-23 @ 08:50), Max: 37.1 (01-20-23 @ 08:50)  T(F): 98.8 (01-20-23 @ 08:50), Max: 98.8 (01-20-23 @ 08:50)  HR: 61 (01-20-23 @ 08:50) (61 - 61)  BP: 101/66 (01-20-23 @ 08:50) (101/66 - 101/66)  BP(mean): --  RR: 16 (01-20-23 @ 08:50) (16 - 16)  SpO2: 94% (01-20-23 @ 08:50) (94% - 94%)  AVSS    PE:  General: Patient alert and oriented, NAD  Dressing: Clean/dry/intact left knee w/ overlying cryocuff  Pulses: DP pulse palpable right lower extremity   Sensation: intact to bilateral lower extremities   Motor: EHL/FHL/TA/GS 5/5 bilateral lower extremities                           11.8   18.84 )-----------( 304      ( 20 Jan 2023 06:11 )             35.5   01-20    136  |  101  |  29<H>  ----------------------------<  146<H>  3.8   |  26  |  0.61    Ca    9.2      20 Jan 2023 06:11        A/P: 77yFemale POD#1 s/p right TKR  1. Pain control as needed  2. DVT prophylaxis: ASA  3. PT, weight-bearing status: WBAT  4. Dispo: pending rehab placement     
Ortho Post Op Check    Procedure:  R TKA   Surgeon: Dr. KHANG Reece    Pt comfortable without complaints, pain controlled  Denies CP, SOB, N/V, numbness/tingling     Vital Signs Last 24 Hrs  T(C): 36.4 (01-19-23 @ 13:47), Max: 36.4 (01-19-23 @ 13:47)  T(F): 97.6 (01-19-23 @ 13:47), Max: 97.6 (01-19-23 @ 13:47)  HR: 64 (01-19-23 @ 14:32) (56 - 64)  BP: 109/64 (01-19-23 @ 14:32) (103/59 - 114/61)  BP(mean): 82 (01-19-23 @ 14:32) (77 - 82)  RR: 15 (01-19-23 @ 14:32) (15 - 20)  SpO2: 99% (01-19-23 @ 14:32) (98% - 100%)    General: Pt Alert and oriented, NAD  DSG C/D/I  Pulses: 2+ DP  Sensation: SILT grossly SPN/DPN/Saph/Sheeba/Tib  Motor: 5/5 TA/GS/EHL, Quad/Psoas firing limited 2/2 pain    Post-op X-Ray: hardware intact w/o fracture    A/P: 77yFemale s/p R TKA by Dr. KHANG Reece on 01-19  - Stable  - Pain Control  - DVT ppx: ASA   - Post op abx: Ancef  - WBS: WBAT  - PT eval     Ortho Pager [phone]
Reporting feeling cold with shivers  found to have wet bedsheets  denies night sweats, pain is controlled    Physical Exam:  General: NAD  Resp: no increased WOB, CTAB  CVS: RRR, no m/r/g, no pitting edema  GI: +BS, nt/nd  Neuro: alert and oriented to person place time and situation  MSK: R knee 3/5 strength, sensation grossly intact    A/P:   1. elective R TKR POD 2 -- management per ortho  2. pre-diabetes -- continue consistent carbohydrate diet.  3. essential HTN -- BP in 130-140's started losartan 100mg po daily,   4. anxiety: continue lexapro 10mg po daily and propanolol  5. insomnia: patient is NOT on ambien, rather per daughter who is an internist, she is on Zaleplon 5mg po nightly at home (nonformulary)  6. post operative state  7. class 1 obesity -- affects all aspects of care  8 reactive leukocytosis due to post op state -- continue to monitor, improved.    plan for dc to [facility] vs [facility], has medicare  
Ortho Note    Pt comfortable without complaints, pain controlled  Denies CP, SOB, N/V, numbness/tingling      Vital Signs Last 24 Hrs  T(C): 36.4 (21 Jan 2023 16:31), Max: 36.8 (21 Jan 2023 09:23)  T(F): 97.5 (21 Jan 2023 16:31), Max: 98.3 (21 Jan 2023 09:23)  HR: 80 (21 Jan 2023 16:31) (52 - 80)  BP: 145/78 (21 Jan 2023 16:31) (95/65 - 145/78)  BP(mean): 87 (21 Jan 2023 05:18) (87 - 87)  RR: 16 (21 Jan 2023 16:31) (16 - 18)  SpO2: 95% (21 Jan 2023 16:31) (93% - 99%)    Parameters below as of 21 Jan 2023 16:31  Patient On (Oxygen Delivery Method): room air    VSS  General: A&Ox3, NAD  RLE: Aquacell DSG C/D/I  Pulses: Foot WWP; DP pulse 2+; Cap refill < 2 sec  Sensation: SILT distally and symmetric to contralateral extremity  Motor: TA/EHL/FHL/GS 5/5 and symmetric to contralateral extremity               LABS/RADIOLOGY RESULTS:                          10.4   12.51 )-----------( 243      ( 21 Jan 2023 05:46 )             31.7   01-21    138  |  105  |  19  ----------------------------<  93  4.2   |  28  |  0.63    Ca    9.0      21 Jan 2023 05:46    Blood Cultures

## 2023-01-21 NOTE — DISCHARGE NOTE NURSING/CASE MANAGEMENT/SOCIAL WORK - PATIENT PORTAL LINK FT
You can access the FollowMyHealth Patient Portal offered by [facility] by registering at the following website: [email or web address]. By joining [facility]’s FollowMyHealth portal, you will also be able to view your health information using other applications (apps) compatible with our system.

## 2023-01-26 DIAGNOSIS — E66.9 OBESITY, UNSPECIFIED: ICD-10-CM

## 2023-01-26 DIAGNOSIS — M17.11 UNILATERAL PRIMARY OSTEOARTHRITIS, RIGHT KNEE: ICD-10-CM

## 2023-01-26 DIAGNOSIS — Z79.82 LONG TERM (CURRENT) USE OF ASPIRIN: ICD-10-CM

## 2023-01-26 DIAGNOSIS — F41.8 OTHER SPECIFIED ANXIETY DISORDERS: ICD-10-CM

## 2023-01-26 DIAGNOSIS — K21.9 GASTRO-ESOPHAGEAL REFLUX DISEASE WITHOUT ESOPHAGITIS: ICD-10-CM

## 2023-01-26 DIAGNOSIS — E11.9 TYPE 2 DIABETES MELLITUS WITHOUT COMPLICATIONS: ICD-10-CM

## 2023-01-26 DIAGNOSIS — E78.5 HYPERLIPIDEMIA, UNSPECIFIED: ICD-10-CM

## 2023-01-26 DIAGNOSIS — M25.561 PAIN IN RIGHT KNEE: ICD-10-CM

## 2023-01-26 DIAGNOSIS — Z88.1 ALLERGY STATUS TO OTHER ANTIBIOTIC AGENTS STATUS: ICD-10-CM

## 2023-01-26 DIAGNOSIS — Z79.84 LONG TERM (CURRENT) USE OF ORAL HYPOGLYCEMIC DRUGS: ICD-10-CM

## 2023-01-30 ENCOUNTER — TRANSCRIPTION ENCOUNTER (OUTPATIENT)
Age: 78
End: 2023-01-30

## 2023-02-03 ENCOUNTER — APPOINTMENT (OUTPATIENT)
Dept: ORTHOPEDIC SURGERY | Facility: CLINIC | Age: 78
End: 2023-02-03
Payer: MEDICARE

## 2023-02-03 VITALS
HEART RATE: 64 BPM | WEIGHT: 184 LBS | BODY MASS INDEX: 31.41 KG/M2 | DIASTOLIC BLOOD PRESSURE: 93 MMHG | SYSTOLIC BLOOD PRESSURE: 167 MMHG | HEIGHT: 64 IN | OXYGEN SATURATION: 98 %

## 2023-02-03 PROBLEM — I10 ESSENTIAL (PRIMARY) HYPERTENSION: Chronic | Status: ACTIVE | Noted: 2023-01-18

## 2023-02-03 PROBLEM — K21.9 GASTRO-ESOPHAGEAL REFLUX DISEASE WITHOUT ESOPHAGITIS: Chronic | Status: ACTIVE | Noted: 2023-01-18

## 2023-02-03 PROBLEM — M19.90 UNSPECIFIED OSTEOARTHRITIS, UNSPECIFIED SITE: Chronic | Status: ACTIVE | Noted: 2023-01-18

## 2023-02-03 PROBLEM — F41.9 ANXIETY DISORDER, UNSPECIFIED: Chronic | Status: ACTIVE | Noted: 2023-01-18

## 2023-02-03 PROBLEM — E78.5 HYPERLIPIDEMIA, UNSPECIFIED: Chronic | Status: ACTIVE | Noted: 2023-01-18

## 2023-02-03 PROCEDURE — 99024 POSTOP FOLLOW-UP VISIT: CPT

## 2023-02-06 NOTE — HISTORY OF PRESENT ILLNESS
[de-identified] : first post op for right total knee arthroplasty , surgical date 01/19/2023  [de-identified] : 02/03/2023: 76 y/o female presenting about 2 weeks s/p right TKA. She is still in the rehab center. She is going to be d/c next week. She rates her pain as 4/10 and has been taking Tylenol as needed. She ambulates with a walker today.  [de-identified] : Right knee: \par - Wounds: well healed surgical incision. She has a large ecchymosis over the area of the adductor canal injection. She also has a diffusely mottled appearance throughout the entire knee and anterior leg consistent with postop inflammation. The appearance of the wound is benign. Tibial stab incisions are well healed and sutures were removed at the facility. \par - ROM: 3-90 [de-identified] : 78 y/o female approximately 2 weeks s/p right TKA doing well.  [de-identified] : - She was encouraged to continue with PT at the facility and to transition to outpatient PT once at home.\par - She is to continue with Tylenol prn for pain and to wean off of assistive devices as tolerated. We reviewed appropriate expectations of recovery of postop ROM. \par - F/u in 4 weeks with repeat right knee XR.

## 2023-02-06 NOTE — END OF VISIT
[FreeTextEntry3] : All medical record entries made by the Scribe were at my, Dr. Pankaj Reece, direction and personally dictated by me on 02/03/2023. I have reviewed the chart and agree that the record accurately reflects my personal performance of the history, physical exam, assessment and plan. I have also personally directed, reviewed, and agreed with the chart.

## 2023-02-10 ENCOUNTER — TRANSCRIPTION ENCOUNTER (OUTPATIENT)
Age: 78
End: 2023-02-10

## 2023-02-27 ENCOUNTER — APPOINTMENT (OUTPATIENT)
Dept: ORTHOPEDIC SURGERY | Facility: CLINIC | Age: 78
End: 2023-02-27

## 2023-03-24 ENCOUNTER — APPOINTMENT (OUTPATIENT)
Dept: ORTHOPEDIC SURGERY | Facility: CLINIC | Age: 78
End: 2023-03-24
Payer: MEDICARE

## 2023-03-24 ENCOUNTER — OUTPATIENT (OUTPATIENT)
Dept: OUTPATIENT SERVICES | Facility: HOSPITAL | Age: 78
LOS: 1 days | End: 2023-03-24
Payer: MEDICARE

## 2023-03-24 ENCOUNTER — RESULT REVIEW (OUTPATIENT)
Age: 78
End: 2023-03-24

## 2023-03-24 VITALS
HEIGHT: 64 IN | OXYGEN SATURATION: 93 % | SYSTOLIC BLOOD PRESSURE: 107 MMHG | DIASTOLIC BLOOD PRESSURE: 72 MMHG | WEIGHT: 184 LBS | HEART RATE: 73 BPM | BODY MASS INDEX: 31.41 KG/M2

## 2023-03-24 DIAGNOSIS — Z98.890 OTHER SPECIFIED POSTPROCEDURAL STATES: Chronic | ICD-10-CM

## 2023-03-24 PROCEDURE — 73564 X-RAY EXAM KNEE 4 OR MORE: CPT

## 2023-03-24 PROCEDURE — 73564 X-RAY EXAM KNEE 4 OR MORE: CPT | Mod: 26,RT

## 2023-03-24 PROCEDURE — 99024 POSTOP FOLLOW-UP VISIT: CPT

## 2023-03-29 NOTE — END OF VISIT
[FreeTextEntry3] : All medical record entries made by the Scribe were at my, Dr. Pankaj Reece, direction and personally dictated by me on 03/24/2023. I have reviewed the chart and agree that the record accurately reflects my personal performance of the history, physical exam, assessment and plan. I have also personally directed, reviewed, and agreed with the chart.

## 2023-03-29 NOTE — HISTORY OF PRESENT ILLNESS
[de-identified] : second post op for right total knee arthroplasty , surgical date 01/19/2023. \par  [de-identified] : 78 y/o female presenting for 2nd postop visit following right TKA performed on 1/21/23. Her daughter is virtually present for today's examination [facility]. She reports she was doing very well up until last Wednesday when she was doing a PT session and she feels she overexerted herself and she has had more pain in the knee since then. She has been taking no pain medications. She ambulates without an assistive device and she denies any other new symptoms.  [de-identified] : Gait: presents without an assistive device, demonstrates mild right sided antalgia\par \par Right knee:\par - Focal soft tissue swelling: none\par - Ecchymosis: none\par - Erythema: none\par - Effusion: moderate, small Baker's cyst\par - Wounds: well healed midline incision, benign appearing as well as well healed tibial stab incisions\par - Alignment: normal\par - Tenderness: TTP about the knee circumferentially\par - ROM: 0-105\par - Collateral laxity: none\par - Cruciate laxity: none\par - Popliteal angle (degrees): 10\par - Quad strength: 5/5\par - In moving from flexion to extension, there is some palpable crepitus along the medial gastrocnemius tendons.  [de-identified] : 4 radiographic views were taken of the right knee. These demonstrate stable position of the right TKA as compared to previous imaging without evidence of mechanical complication. Patella sits at normal height and tracks centrally.  [de-identified] : 76 y/o female now approximately 2 months s/p right TKA doing well, though with some acute on chronic postop pain.  [de-identified] : - Her radiographs are stable and her knee is clinically benign with improved ROM and acceptable strength and stability. I gave her reassurance that there does not appear to be any critical mechanical issues that would require additional invasive management. I do expect that this inflammatory pain will subside over more time.\par - I encouraged her to resume Tylenol and celecoxib and to wean as tolerated as her symptoms subside, to continue with outpatient PT and HEP. \par - F/u in 2 months with repeat right knee XR. \par - If her symptoms persist, then we may consider a right knee MRI in the future.

## 2023-05-26 ENCOUNTER — APPOINTMENT (OUTPATIENT)
Dept: ORTHOPEDIC SURGERY | Facility: CLINIC | Age: 78
End: 2023-05-26

## 2023-12-13 ENCOUNTER — APPOINTMENT (OUTPATIENT)
Dept: ORTHOPEDIC SURGERY | Facility: CLINIC | Age: 78
End: 2023-12-13
Payer: MEDICARE

## 2023-12-13 ENCOUNTER — OUTPATIENT (OUTPATIENT)
Dept: OUTPATIENT SERVICES | Facility: HOSPITAL | Age: 78
LOS: 1 days | End: 2023-12-13
Payer: MEDICARE

## 2023-12-13 ENCOUNTER — RESULT REVIEW (OUTPATIENT)
Age: 78
End: 2023-12-13

## 2023-12-13 VITALS
SYSTOLIC BLOOD PRESSURE: 121 MMHG | OXYGEN SATURATION: 96 % | HEART RATE: 67 BPM | BODY MASS INDEX: 31.41 KG/M2 | HEIGHT: 64 IN | WEIGHT: 184 LBS | DIASTOLIC BLOOD PRESSURE: 79 MMHG

## 2023-12-13 DIAGNOSIS — M17.0 BILATERAL PRIMARY OSTEOARTHRITIS OF KNEE: ICD-10-CM

## 2023-12-13 DIAGNOSIS — Z98.890 OTHER SPECIFIED POSTPROCEDURAL STATES: Chronic | ICD-10-CM

## 2023-12-13 DIAGNOSIS — Z96.651 AFTERCARE FOLLOWING JOINT REPLACEMENT SURGERY: ICD-10-CM

## 2023-12-13 DIAGNOSIS — M17.12 UNILATERAL PRIMARY OSTEOARTHRITIS, LEFT KNEE: ICD-10-CM

## 2023-12-13 DIAGNOSIS — Z47.1 AFTERCARE FOLLOWING JOINT REPLACEMENT SURGERY: ICD-10-CM

## 2023-12-13 PROCEDURE — 73564 X-RAY EXAM KNEE 4 OR MORE: CPT

## 2023-12-13 PROCEDURE — 99214 OFFICE O/P EST MOD 30 MIN: CPT

## 2023-12-13 PROCEDURE — 73564 X-RAY EXAM KNEE 4 OR MORE: CPT | Mod: 26,50

## 2023-12-14 PROBLEM — M17.12 PRIMARY OSTEOARTHRITIS OF LEFT KNEE: Status: ACTIVE | Noted: 2023-12-13

## 2023-12-14 PROBLEM — Z47.1 AFTERCARE FOLLOWING RIGHT KNEE JOINT REPLACEMENT SURGERY: Status: ACTIVE | Noted: 2023-01-17

## 2023-12-14 NOTE — DISCUSSION/SUMMARY
[de-identified] : 78F now approximately 11 months status post right total knee arthroplasty, doing well, with left knee osteoarthritis and genu valgum.  - She has indicated at this time for left total knee arthroplasty with EDIE robotic assistance. - We discussed the details of the procedure, the expected recovery period, and the expected outcome. We discussed the likelihood of satisfaction after complete recovery, and the potential causes of dissatisfaction. The importance of active patient participation in the rehabilitation protocol was emphasized, along with its influence on short and long-term outcomes. Specific risks of total knee replacement were discussed in detail. We discussed the risk of surgical site complications including but not limited to: surgical site infection, wound healing complications, bone fracture, tendon or ligament injury, neurovascular injury, hemorrhage, postoperative stiffness or instability, persistent pain and need for reoperation or manipulation under anesthesia. We discussed surgical blood loss and the possible need for blood transfusion. We discussed the risk of perioperative medical complications, including but not limited to catheter-associated urinary tract infection, venous thromboembolism and other cardiopulmonary complications. We discussed anesthetic options and the risk of anesthesia-related complications. We discussed implant fixation methods; my plan would be to use fully cemented fixation in this case. We discussed the variable need to resurface the patella; my plan would be to resurface in this case. We discussed the durability of prosthetic knees and limitations related to wear, osteolysis and loosening.  We discussed the role of the robot in helping to guide bone resections and measure alignment and soft tissue balance. All questions were answered the patient's satisfaction. The patient was given a copy of my preoperative packet with additional information about the procedure. I asked the patient to either call back or schedule a followup appointment for any additional questions or concerns regarding the procedure. - She will be booked for surgery at a convenient time with routine medical clearance. - I discouraged her from pursuing any further knee injections leading up until surgery, but I did recommend that she use tylenol and meloxicam as needed. - Patient is [place] speaking and will not be a candidate to participate in the perioperative clinical trials - Other side implants: D-tibia plus stubby, 8 right narrow PS femur, 12 PS poly, the patella was left unresurfaced

## 2023-12-14 NOTE — HISTORY OF PRESENT ILLNESS
[de-identified] : R TKA 1/19/23 12/13/23: 78F presenting for follow up evaluation of right total knee replacement as well as left knee osteoarthritis. She is now 11 months postop from the R TKA and she is happy with its function. She complains today of her left knee pain, taking tylenol as needed. She'd like to discuss left total knee replacement today.  10/28/2022: 78 y/o [place] citizen speaking female f/u for b/l OA. She reports good relief in her symptoms following the right knee aspiration and CSI last visit and she has been participating in PT and home exercise which is mostly water aerobics. However, she does feel that the right knee pain has been continuing to be somewhat of an issue and has decided that she would like to move forward with right total knee arthroplasty at some point in January 2023. 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 She notes that her left knee pain has increased since our last visit together and she would like to consider some injection treatments for the left knee as well.   8/5/22: 75 y/o [place] citizen speaking female presents for bilateral knee pain R>L. She endorses difficulty and pain mainly with bending the right knee. Takes ibuprofen and applies topical ointment as needed for pain. Patient ambulates with a walker and reports a walking limitation of less than 1 block before having to stop and rest. Patient lives with her  in an elevator complex. Her daughter is an IM physician in [place] and participated in the conversation via phone regarding her mothers care.  Allergy: she has a charted history of allergy to amoxicillin but has no recollection of what or when the reaction was

## 2023-12-14 NOTE — PHYSICAL EXAM
[de-identified] : General appearance: well nourished and hydrated, pleasant, alert and oriented x 3, cooperative.   HEENT: normocephalic, EOM intact, wearing mask, external auditory canal clear.   Cardiovascular: no lower leg edema, no varicosities, dorsalis pedis pulses palpable and symmetric.   Lymphatics: no palpable lymphadenopathy, no lymphedema.   Neurologic: sensation is normal, no muscle weakness in upper or lower extremities, patella tendon reflexes present and symmetric.   Dermatologic: skin moist, warm, no rash.   Spine: cervical spine with normal lordosis and painless range of motion, thoracic spine with normal kyphosis and painless range of motion, lumbosacral spine with normal lordosis and painless range of motion.  No tenderness to palpation along midline spine and paraspinal musculature.  Sacroiliac joints nontender bilaterally. Negative SLR and crossed SLR tests bilaterally. Gait: bilateral caution and left sided antalgia as well as a left valgus thrust pattern  Left knee:  - Focal soft tissue swelling: none - Ecchymosis: none - Erythema: none - Effusion: small associated effusion, moderate baker's cyst - Wounds: anterolateral incision well healed and benign appearing - Alignment: marked valgus malalignment - Tenderness: none - ROM: 5 to 130 - Collateral laxity: demonstrates increased laxity to valgus with a soft endpoint - Cruciate laxity: stable - Popliteal angle (degrees): 15 - Quad strength: 4+/5 effort limited by pain  Right knee: - Focal soft tissue swelling: none - Ecchymosis: none - Erythema: none - Effusion: none - Wounds: well healed midline incision, benign appearing - Alignment: normal - Tenderness: none - ROM: 0 to 115, mildly increased laxity to both varus and valgus in extension with firm endpoints - Collateral laxity: none - Cruciate laxity: stable - Popliteal angle (degrees): 10 - Quad strength: 5/5 [de-identified] : Taken at: [facility] Taken on: 12/13/23 Bilateral knee x-rays  Right knee: - Stable appearance of a right total knee arthroplasty as comapred to previous imaging without evidence of mechanical complication. - Patella sits at normal height and tracks centrally.  Left knee: - Demonstrates marked valgus malalignment, tricompartmental osteoarthritis most pronounced laterally with bone on bone articulation, kellgren-shawnee 4. - Patella sits at normal height and tracks centrally.

## 2023-12-14 NOTE — END OF VISIT
[FreeTextEntry3] :  All medical record entries made by the Scribe were at my, Dr. Pankaj Reece's, direction and personally dictated by me on 12/13/2023. I have reviewed the chart and agree that the record accurately reflects my personal performance of the history, physical exam, assessment and plan. I have also personally directed, reviewed, and agreed with the chart.

## 2024-01-08 ENCOUNTER — NON-APPOINTMENT (OUTPATIENT)
Age: 79
End: 2024-01-08

## 2024-01-18 NOTE — H&P ADULT - NSHPPHYSICALEXAM_GEN_ALL_CORE
Gen: 78F NAD  MSK: Decreased left knee ROM secondary to pain      Rest of PE per MD clearance Gen: 78F NAD  MSK: Decreased left knee ROM secondary to pain  MSK: No deformity or open wounds. No rashes or lesions. EHL/TA/GS/FHL 5/5 BLE. Sensation intact and equal BLE. Skin warm and well perfused. DP palpable BLE. Cap refill brisk. Negative floyd bilaterally.   Rest of PE per MD clearance

## 2024-01-18 NOTE — H&P ADULT - HISTORY OF PRESENT ILLNESS
A[place]ijani speaking patient   78F c/o left knee pain x     Presents today for a left total knee replacement Czech speaking patient   78F c/o left knee pain x few months without accident or injury. Pt attributes pain to OA and had right knee replaced last year with Dr. Reece. Pain persists despite conservative measures. Pt ambulates without assistance. Denies numbness, tingling, paresthesias to BLE.    Presents today for a left total knee replacement

## 2024-01-18 NOTE — H&P ADULT - NSHPPOADEEPVENOUSTHROMB_GEN_A_CORE
Mahendra is a 12 year old boy presenting with his mother for physical exam.  No acute illness or concerns including hearing and vision.    Vision Screening    Right eye Left eye Both eyes   Without correction 20/20 20/20    With correction        He is attending 5th grade.   Mahendra is eating a complete diet.   50 %ile (Z= 0.01) based on CDC (Boys, 2-20 Years) weight-for-age data using vitals from 12/22/2023.  23 %ile (Z= -0.75) based on CDC (Boys, 2-20 Years) Stature-for-age data based on Stature recorded on 12/22/2023.  The family has fluoridated water supply.    No chronic medical problems.    ALLERGIES:  No Known Allergies    No current outpatient medications on file.     No current facility-administered medications for this visit.       Immunizations are not up-to-date.    Objective:  Alert and well-appearing.  Vitals:    12/22/23 1631   BP: 102/58   Pulse: 92   Resp: 20   Temp: 97.7 °F (36.5 °C)   TempSrc: Temporal   Weight: 33.2 kg (73 lb 4.8 oz)   Height: 4' 5.27\" (1.353 m)     HEENT: Tympanic membranes clear bilaterally. Pupils equally round and reactive to light, extraocular movements intact. Oral mucous membranes moist, no lesions.  Tonsils 2+. Age-appropriate dentition without visible caries.  NECK: Supple, lymph nodes not enlarged.  Normal thyroid gland.  LUNGS: Clear to auscultation.  CARDIOVASCULAR: Heart with regular rate and rhythm, no murmur. Femoral pulses strong bilaterally.  ABDOMEN:  Nondistended, normoactive bowel sounds. No hepatosplenomegaly or mass. Soft, nontender to palpation.  GENITOURINARY: Harmeet stage I male,testes bilaterally descended.  MUSCULOSKELETAL: Symmetric leg length, normal plantar arches. No scoliosis. Upper and lower extremity joints full range of motion.  SKIN:  Well-perfused, normal turgor, no rash.  NEUROLOGICAL: Cranial nerves II through XII intact. Symmetric strength 5 out of 5, and deep tendon reflexes 2+ in upper and lower extremities. Normal sensation.    Nonfasting  lipid panel is normal.      ASSESSMENT:  Healthy 12 year old boy.    PLAN:   Reviewed recommended nutrition, dental hygiene and checkups, injury prevention, skin care and protection.  The parent was counseled on the vaccines and components, including risks and benefits of the immunizations, as well as risks of not receiving the immunization. Related handouts were provided.  Mahendra received Gardasil and Boostrix vaccines.  The patient's mother declined COVID-19 booster and influenza vaccines.  Recommended physical exam in one year.   no

## 2024-01-18 NOTE — H&P ADULT - PROBLEM SELECTOR PLAN 1
Admit to Orthopedic Service for elective left total knee replacement    Medically cleared and optimized for surgery by  Dr. Saxena

## 2024-01-18 NOTE — H&P ADULT - NSHPLABSRESULTS_GEN_ALL_CORE
Preop CBC, BMP, PT/INR, PTT within normal range and reviewed per medical clearance  H/H: 12.7/ 38.9   Cr: 0.67  A1C: 5.9  UA: wnl, urine culture + proteus mirabelis, sensitive to ciprofloxacin     Preop EKG within normal limits and reviewed per medical clearance  3M: DOS

## 2024-01-18 NOTE — H&P ADULT - NSICDXPASTSURGICALHX_GEN_ALL_CORE_FT
PAST SURGICAL HISTORY:  H/O colonoscopy     History of surgery left fibula    History of surgery right shoulder lipoma    History of surgery pregnancy temination    S/P breast lumpectomy right

## 2024-01-19 VITALS
HEART RATE: 60 BPM | OXYGEN SATURATION: 97 % | HEIGHT: 59 IN | RESPIRATION RATE: 16 BRPM | TEMPERATURE: 98 F | SYSTOLIC BLOOD PRESSURE: 137 MMHG | WEIGHT: 181 LBS | DIASTOLIC BLOOD PRESSURE: 90 MMHG

## 2024-01-19 RX ORDER — POVIDONE-IODINE 5 %
1 AEROSOL (ML) TOPICAL ONCE
Refills: 0 | Status: DISCONTINUED | OUTPATIENT
Start: 2024-01-22 | End: 2024-01-25

## 2024-01-19 RX ORDER — CHLORHEXIDINE GLUCONATE 213 G/1000ML
1 SOLUTION TOPICAL EVERY 12 HOURS
Refills: 0 | Status: COMPLETED | OUTPATIENT
Start: 2024-01-22 | End: 2024-01-23

## 2024-01-19 RX ORDER — HYDROCHLOROTHIAZIDE 25 MG
1 TABLET ORAL
Qty: 0 | Refills: 0 | DISCHARGE

## 2024-01-19 RX ORDER — METFORMIN HYDROCHLORIDE 850 MG/1
1 TABLET ORAL
Refills: 0 | DISCHARGE

## 2024-01-19 NOTE — ASU PATIENT PROFILE, ADULT - NSICDXPASTMEDICALHX_GEN_ALL_CORE_FT
PAST MEDICAL HISTORY:  Anxiety and depression     DM (diabetes mellitus) not on medication    GERD (gastroesophageal reflux disease)     HLD (hyperlipidemia)     HTN (hypertension)     Osteoarthritis

## 2024-01-21 ENCOUNTER — TRANSCRIPTION ENCOUNTER (OUTPATIENT)
Age: 79
End: 2024-01-21

## 2024-01-21 RX ORDER — ACETAMINOPHEN 500 MG/1
500 TABLET ORAL
Qty: 180 | Refills: 1 | Status: ACTIVE | COMMUNITY
Start: 2024-01-21 | End: 1900-01-01

## 2024-01-21 RX ORDER — PANTOPRAZOLE 40 MG/1
40 TABLET, DELAYED RELEASE ORAL DAILY
Qty: 30 | Refills: 2 | Status: ACTIVE | COMMUNITY
Start: 2024-01-21 | End: 1900-01-01

## 2024-01-21 RX ORDER — OXYCODONE 5 MG/1
5 TABLET ORAL
Qty: 50 | Refills: 0 | Status: ACTIVE | COMMUNITY
Start: 2024-01-21 | End: 1900-01-01

## 2024-01-21 RX ORDER — CELECOXIB 200 MG/1
200 CAPSULE ORAL TWICE DAILY
Qty: 60 | Refills: 2 | Status: ACTIVE | COMMUNITY
Start: 2024-01-21 | End: 1900-01-01

## 2024-01-21 RX ORDER — ASPIRIN ENTERIC COATED TABLETS 81 MG 81 MG/1
81 TABLET, DELAYED RELEASE ORAL
Qty: 60 | Refills: 0 | Status: ACTIVE | COMMUNITY
Start: 2024-01-21 | End: 1900-01-01

## 2024-01-22 ENCOUNTER — APPOINTMENT (OUTPATIENT)
Dept: ORTHOPEDIC SURGERY | Facility: HOSPITAL | Age: 79
End: 2024-01-22

## 2024-01-22 ENCOUNTER — INPATIENT (INPATIENT)
Facility: HOSPITAL | Age: 79
LOS: 2 days | Discharge: NURSING, REHAB OR LONG-TERM CARE | DRG: 470 | End: 2024-01-25
Attending: ORTHOPAEDIC SURGERY | Admitting: ORTHOPAEDIC SURGERY
Payer: MEDICARE

## 2024-01-22 ENCOUNTER — TRANSCRIPTION ENCOUNTER (OUTPATIENT)
Age: 79
End: 2024-01-22

## 2024-01-22 ENCOUNTER — RESULT REVIEW (OUTPATIENT)
Age: 79
End: 2024-01-22

## 2024-01-22 DIAGNOSIS — F41.9 ANXIETY DISORDER, UNSPECIFIED: ICD-10-CM

## 2024-01-22 DIAGNOSIS — Z98.890 OTHER SPECIFIED POSTPROCEDURAL STATES: Chronic | ICD-10-CM

## 2024-01-22 DIAGNOSIS — I10 ESSENTIAL (PRIMARY) HYPERTENSION: ICD-10-CM

## 2024-01-22 DIAGNOSIS — K21.9 GASTRO-ESOPHAGEAL REFLUX DISEASE WITHOUT ESOPHAGITIS: ICD-10-CM

## 2024-01-22 DIAGNOSIS — E78.5 HYPERLIPIDEMIA, UNSPECIFIED: ICD-10-CM

## 2024-01-22 DIAGNOSIS — M19.90 UNSPECIFIED OSTEOARTHRITIS, UNSPECIFIED SITE: ICD-10-CM

## 2024-01-22 DIAGNOSIS — E11.9 TYPE 2 DIABETES MELLITUS WITHOUT COMPLICATIONS: ICD-10-CM

## 2024-01-22 DIAGNOSIS — M17.12 UNILATERAL PRIMARY OSTEOARTHRITIS, LEFT KNEE: ICD-10-CM

## 2024-01-22 LAB — GLUCOSE BLDC GLUCOMTR-MCNC: 104 MG/DL — HIGH (ref 70–99)

## 2024-01-22 PROCEDURE — S2900 ROBOTIC SURGICAL SYSTEM: CPT | Mod: NC

## 2024-01-22 PROCEDURE — 27447 TOTAL KNEE ARTHROPLASTY: CPT | Mod: LT

## 2024-01-22 PROCEDURE — 73560 X-RAY EXAM OF KNEE 1 OR 2: CPT | Mod: 26,LT

## 2024-01-22 DEVICE — ZIMMER/NEXGEN SMOOTH PIN 3.2X75MM: Type: IMPLANTABLE DEVICE | Site: LEFT | Status: FUNCTIONAL

## 2024-01-22 DEVICE — STEM EXT PERSONA 14MM PLUS 30M: Type: IMPLANTABLE DEVICE | Site: LEFT | Status: FUNCTIONAL

## 2024-01-22 DEVICE — PIN CAS FIX 3.2X150MM: Type: IMPLANTABLE DEVICE | Site: LEFT | Status: FUNCTIONAL

## 2024-01-22 DEVICE — FEM PERSONA PS CMT CCR NRW SZ 6 L: Type: IMPLANTABLE DEVICE | Site: LEFT | Status: FUNCTIONAL

## 2024-01-22 DEVICE — PIN FIX CAS 3.2X80MM STR: Type: IMPLANTABLE DEVICE | Site: LEFT | Status: FUNCTIONAL

## 2024-01-22 DEVICE — ZIMMER/NEXGEN HEX HEAD SCREW 3.5MM: Type: IMPLANTABLE DEVICE | Site: LEFT | Status: FUNCTIONAL

## 2024-01-22 DEVICE — CEMENT PALACOS R: Type: IMPLANTABLE DEVICE | Site: LEFT | Status: FUNCTIONAL

## 2024-01-22 DEVICE — SURF ART PERSONA LT 6-9 CD 12MM: Type: IMPLANTABLE DEVICE | Site: LEFT | Status: FUNCTIONAL

## 2024-01-22 DEVICE — TIB PSN NP STM 5 DEG SZ DL: Type: IMPLANTABLE DEVICE | Site: LEFT | Status: FUNCTIONAL

## 2024-01-22 DEVICE — CELLERATE SURGICAL POWDER RX 5GM: Type: IMPLANTABLE DEVICE | Site: LEFT | Status: FUNCTIONAL

## 2024-01-22 DEVICE — ZIMMER FEMALE HEX SCREW MAGNETIC 2.5MM X 25MM: Type: IMPLANTABLE DEVICE | Site: LEFT | Status: FUNCTIONAL

## 2024-01-22 RX ORDER — ASPIRIN/CALCIUM CARB/MAGNESIUM 324 MG
81 TABLET ORAL
Refills: 0 | Status: DISCONTINUED | OUTPATIENT
Start: 2024-01-23 | End: 2024-01-25

## 2024-01-22 RX ORDER — SODIUM CHLORIDE 9 MG/ML
1000 INJECTION, SOLUTION INTRAVENOUS
Refills: 0 | Status: DISCONTINUED | OUTPATIENT
Start: 2024-01-23 | End: 2024-01-25

## 2024-01-22 RX ORDER — PANTOPRAZOLE SODIUM 20 MG/1
40 TABLET, DELAYED RELEASE ORAL
Refills: 0 | Status: DISCONTINUED | OUTPATIENT
Start: 2024-01-22 | End: 2024-01-25

## 2024-01-22 RX ORDER — ESCITALOPRAM OXALATE 10 MG/1
1 TABLET, FILM COATED ORAL
Qty: 0 | Refills: 0 | DISCHARGE

## 2024-01-22 RX ORDER — HYDROMORPHONE HYDROCHLORIDE 2 MG/ML
0.5 INJECTION INTRAMUSCULAR; INTRAVENOUS; SUBCUTANEOUS
Refills: 0 | Status: DISCONTINUED | OUTPATIENT
Start: 2024-01-22 | End: 2024-01-25

## 2024-01-22 RX ORDER — ACETAMINOPHEN 500 MG
650 TABLET ORAL EVERY 6 HOURS
Refills: 0 | Status: DISCONTINUED | OUTPATIENT
Start: 2024-01-22 | End: 2024-01-24

## 2024-01-22 RX ORDER — HYDROMORPHONE HYDROCHLORIDE 2 MG/ML
0.5 INJECTION INTRAMUSCULAR; INTRAVENOUS; SUBCUTANEOUS EVERY 4 HOURS
Refills: 0 | Status: DISCONTINUED | OUTPATIENT
Start: 2024-01-22 | End: 2024-01-23

## 2024-01-22 RX ORDER — HYDROMORPHONE HYDROCHLORIDE 2 MG/ML
0.5 INJECTION INTRAMUSCULAR; INTRAVENOUS; SUBCUTANEOUS
Refills: 0 | Status: DISCONTINUED | OUTPATIENT
Start: 2024-01-22 | End: 2024-01-23

## 2024-01-22 RX ORDER — CELECOXIB 200 MG/1
400 CAPSULE ORAL ONCE
Refills: 0 | Status: COMPLETED | OUTPATIENT
Start: 2024-01-22 | End: 2024-01-22

## 2024-01-22 RX ORDER — SENNA PLUS 8.6 MG/1
2 TABLET ORAL AT BEDTIME
Refills: 0 | Status: DISCONTINUED | OUTPATIENT
Start: 2024-01-22 | End: 2024-01-25

## 2024-01-22 RX ORDER — OXYCODONE HYDROCHLORIDE 5 MG/1
10 TABLET ORAL EVERY 4 HOURS
Refills: 0 | Status: DISCONTINUED | OUTPATIENT
Start: 2024-01-22 | End: 2024-01-25

## 2024-01-22 RX ORDER — POLYETHYLENE GLYCOL 3350 17 G/17G
17 POWDER, FOR SOLUTION ORAL AT BEDTIME
Refills: 0 | Status: DISCONTINUED | OUTPATIENT
Start: 2024-01-22 | End: 2024-01-25

## 2024-01-22 RX ORDER — CEFAZOLIN SODIUM 1 G
2000 VIAL (EA) INJECTION EVERY 8 HOURS
Refills: 0 | Status: COMPLETED | OUTPATIENT
Start: 2024-01-22 | End: 2024-01-23

## 2024-01-22 RX ORDER — HYDROMORPHONE HYDROCHLORIDE 2 MG/ML
0.5 INJECTION INTRAMUSCULAR; INTRAVENOUS; SUBCUTANEOUS EVERY 4 HOURS
Refills: 0 | Status: DISCONTINUED | OUTPATIENT
Start: 2024-01-22 | End: 2024-01-25

## 2024-01-22 RX ORDER — ONDANSETRON 8 MG/1
4 TABLET, FILM COATED ORAL EVERY 6 HOURS
Refills: 0 | Status: DISCONTINUED | OUTPATIENT
Start: 2024-01-22 | End: 2024-01-25

## 2024-01-22 RX ORDER — MAGNESIUM HYDROXIDE 400 MG/1
30 TABLET, CHEWABLE ORAL DAILY
Refills: 0 | Status: DISCONTINUED | OUTPATIENT
Start: 2024-01-22 | End: 2024-01-25

## 2024-01-22 RX ORDER — ESCITALOPRAM OXALATE 10 MG/1
10 TABLET, FILM COATED ORAL DAILY
Refills: 0 | Status: DISCONTINUED | OUTPATIENT
Start: 2024-01-22 | End: 2024-01-25

## 2024-01-22 RX ORDER — IRBESARTAN AND HYDROCHLOROTHIAZIDE 12.5; 3 MG/1; MG/1
1 TABLET ORAL
Qty: 0 | Refills: 0 | DISCHARGE

## 2024-01-22 RX ORDER — PROPRANOLOL HCL 160 MG
1 CAPSULE, EXTENDED RELEASE 24HR ORAL
Qty: 0 | Refills: 0 | DISCHARGE

## 2024-01-22 RX ORDER — APREPITANT 80 MG/1
40 CAPSULE ORAL ONCE
Refills: 0 | Status: COMPLETED | OUTPATIENT
Start: 2024-01-22 | End: 2024-01-22

## 2024-01-22 RX ORDER — OXYCODONE HYDROCHLORIDE 5 MG/1
5 TABLET ORAL EVERY 4 HOURS
Refills: 0 | Status: DISCONTINUED | OUTPATIENT
Start: 2024-01-22 | End: 2024-01-25

## 2024-01-22 RX ORDER — ACETAMINOPHEN 500 MG
1000 TABLET ORAL ONCE
Refills: 0 | Status: COMPLETED | OUTPATIENT
Start: 2024-01-22 | End: 2024-01-22

## 2024-01-22 RX ADMIN — Medication 650 MILLIGRAM(S): at 22:29

## 2024-01-22 RX ADMIN — Medication 1000 MILLIGRAM(S): at 11:40

## 2024-01-22 RX ADMIN — Medication 100 MILLIGRAM(S): at 19:24

## 2024-01-22 RX ADMIN — CELECOXIB 400 MILLIGRAM(S): 200 CAPSULE ORAL at 11:40

## 2024-01-22 RX ADMIN — Medication 650 MILLIGRAM(S): at 19:24

## 2024-01-22 RX ADMIN — SENNA PLUS 2 TABLET(S): 8.6 TABLET ORAL at 21:50

## 2024-01-22 RX ADMIN — APREPITANT 40 MILLIGRAM(S): 80 CAPSULE ORAL at 11:40

## 2024-01-22 RX ADMIN — POLYETHYLENE GLYCOL 3350 17 GRAM(S): 17 POWDER, FOR SOLUTION ORAL at 21:55

## 2024-01-22 NOTE — PHYSICAL THERAPY INITIAL EVALUATION ADULT - GAIT DEVIATIONS NOTED, PT EVAL
slightly unsteady gait, no lose of balance, decreased heel strike and knee flexion on LLE during ambulation./decreased cuba/increased time in double stance/decreased step length

## 2024-01-22 NOTE — PHYSICAL THERAPY INITIAL EVALUATION ADULT - ADDITIONAL COMMENTS
Pt lives with spouse in an apt, few steps enter. Prior to admission, pt ambulated with shopping cart, pt also has a rollator at home.

## 2024-01-22 NOTE — PHYSICAL THERAPY INITIAL EVALUATION ADULT - GENERAL OBSERVATIONS, REHAB EVAL
Called patient, discussed her eczema with her. Symptoms not improving, patient already on a level 1 potency corticosteroid. Will have patient follow-up again with Dr. Forde from [facility] dermatology who she had seen last year.     -Referral placed for dermatology.    Pt received semi supine in bed with +hep-lock  +cryo cuff L knee, NAD, daughter present. Pt left as found, NAD, call bell in reach, daughter present, YVONNE brars

## 2024-01-23 LAB
ANION GAP SERPL CALC-SCNC: 8 MMOL/L — SIGNIFICANT CHANGE UP (ref 5–17)
BUN SERPL-MCNC: 17 MG/DL — SIGNIFICANT CHANGE UP (ref 7–23)
CALCIUM SERPL-MCNC: 9.5 MG/DL — SIGNIFICANT CHANGE UP (ref 8.4–10.5)
CHLORIDE SERPL-SCNC: 102 MMOL/L — SIGNIFICANT CHANGE UP (ref 96–108)
CO2 SERPL-SCNC: 27 MMOL/L — SIGNIFICANT CHANGE UP (ref 22–31)
CREAT SERPL-MCNC: 0.72 MG/DL — SIGNIFICANT CHANGE UP (ref 0.5–1.3)
EGFR: 87 ML/MIN/1.73M2 — SIGNIFICANT CHANGE UP
GLUCOSE BLDC GLUCOMTR-MCNC: 135 MG/DL — HIGH (ref 70–99)
GLUCOSE BLDC GLUCOMTR-MCNC: 141 MG/DL — HIGH (ref 70–99)
GLUCOSE SERPL-MCNC: 136 MG/DL — HIGH (ref 70–99)
HCT VFR BLD CALC: 33.9 % — LOW (ref 34.5–45)
HGB BLD-MCNC: 11.1 G/DL — LOW (ref 11.5–15.5)
MCHC RBC-ENTMCNC: 28.2 PG — SIGNIFICANT CHANGE UP (ref 27–34)
MCHC RBC-ENTMCNC: 32.7 GM/DL — SIGNIFICANT CHANGE UP (ref 32–36)
MCV RBC AUTO: 86 FL — SIGNIFICANT CHANGE UP (ref 80–100)
NRBC # BLD: 0 /100 WBCS — SIGNIFICANT CHANGE UP (ref 0–0)
PLATELET # BLD AUTO: 295 K/UL — SIGNIFICANT CHANGE UP (ref 150–400)
POTASSIUM SERPL-MCNC: 4.4 MMOL/L — SIGNIFICANT CHANGE UP (ref 3.5–5.3)
POTASSIUM SERPL-SCNC: 4.4 MMOL/L — SIGNIFICANT CHANGE UP (ref 3.5–5.3)
RBC # BLD: 3.94 M/UL — SIGNIFICANT CHANGE UP (ref 3.8–5.2)
RBC # FLD: 14 % — SIGNIFICANT CHANGE UP (ref 10.3–14.5)
SODIUM SERPL-SCNC: 137 MMOL/L — SIGNIFICANT CHANGE UP (ref 135–145)
WBC # BLD: 16.36 K/UL — HIGH (ref 3.8–10.5)
WBC # FLD AUTO: 16.36 K/UL — HIGH (ref 3.8–10.5)

## 2024-01-23 PROCEDURE — 99222 1ST HOSP IP/OBS MODERATE 55: CPT

## 2024-01-23 RX ORDER — DEXTROSE 50 % IN WATER 50 %
25 SYRINGE (ML) INTRAVENOUS ONCE
Refills: 0 | Status: DISCONTINUED | OUTPATIENT
Start: 2024-01-23 | End: 2024-01-25

## 2024-01-23 RX ORDER — SODIUM CHLORIDE 9 MG/ML
1000 INJECTION, SOLUTION INTRAVENOUS
Refills: 0 | Status: DISCONTINUED | OUTPATIENT
Start: 2024-01-23 | End: 2024-01-25

## 2024-01-23 RX ORDER — DEXTROSE 50 % IN WATER 50 %
15 SYRINGE (ML) INTRAVENOUS ONCE
Refills: 0 | Status: DISCONTINUED | OUTPATIENT
Start: 2024-01-23 | End: 2024-01-25

## 2024-01-23 RX ORDER — DEXTROSE 50 % IN WATER 50 %
12.5 SYRINGE (ML) INTRAVENOUS ONCE
Refills: 0 | Status: DISCONTINUED | OUTPATIENT
Start: 2024-01-23 | End: 2024-01-25

## 2024-01-23 RX ORDER — ZALEPLON 10 MG
5 CAPSULE ORAL AT BEDTIME
Refills: 0 | Status: DISCONTINUED | OUTPATIENT
Start: 2024-01-23 | End: 2024-01-25

## 2024-01-23 RX ORDER — PROPRANOLOL HCL 160 MG
160 CAPSULE, EXTENDED RELEASE 24HR ORAL DAILY
Refills: 0 | Status: DISCONTINUED | OUTPATIENT
Start: 2024-01-23 | End: 2024-01-25

## 2024-01-23 RX ORDER — GLUCAGON INJECTION, SOLUTION 0.5 MG/.1ML
1 INJECTION, SOLUTION SUBCUTANEOUS ONCE
Refills: 0 | Status: DISCONTINUED | OUTPATIENT
Start: 2024-01-23 | End: 2024-01-25

## 2024-01-23 RX ORDER — BREXPIPRAZOLE 0.25 MG/1
0.25 TABLET ORAL DAILY
Refills: 0 | Status: DISCONTINUED | OUTPATIENT
Start: 2024-01-23 | End: 2024-01-25

## 2024-01-23 RX ORDER — INSULIN LISPRO 100/ML
VIAL (ML) SUBCUTANEOUS
Refills: 0 | Status: DISCONTINUED | OUTPATIENT
Start: 2024-01-23 | End: 2024-01-25

## 2024-01-23 RX ADMIN — Medication 100 MILLIGRAM(S): at 03:00

## 2024-01-23 RX ADMIN — Medication 650 MILLIGRAM(S): at 05:18

## 2024-01-23 RX ADMIN — POLYETHYLENE GLYCOL 3350 17 GRAM(S): 17 POWDER, FOR SOLUTION ORAL at 21:39

## 2024-01-23 RX ADMIN — Medication 650 MILLIGRAM(S): at 12:31

## 2024-01-23 RX ADMIN — Medication 650 MILLIGRAM(S): at 01:26

## 2024-01-23 RX ADMIN — Medication 650 MILLIGRAM(S): at 18:51

## 2024-01-23 RX ADMIN — Medication 650 MILLIGRAM(S): at 23:47

## 2024-01-23 RX ADMIN — ESCITALOPRAM OXALATE 10 MILLIGRAM(S): 10 TABLET, FILM COATED ORAL at 11:32

## 2024-01-23 RX ADMIN — PANTOPRAZOLE SODIUM 40 MILLIGRAM(S): 20 TABLET, DELAYED RELEASE ORAL at 05:18

## 2024-01-23 RX ADMIN — SENNA PLUS 2 TABLET(S): 8.6 TABLET ORAL at 21:39

## 2024-01-23 RX ADMIN — Medication 650 MILLIGRAM(S): at 00:26

## 2024-01-23 RX ADMIN — Medication 81 MILLIGRAM(S): at 17:44

## 2024-01-23 RX ADMIN — Medication 650 MILLIGRAM(S): at 11:31

## 2024-01-23 RX ADMIN — Medication 650 MILLIGRAM(S): at 06:18

## 2024-01-23 RX ADMIN — Medication 650 MILLIGRAM(S): at 17:45

## 2024-01-23 RX ADMIN — BREXPIPRAZOLE 0.25 MILLIGRAM(S): 0.25 TABLET ORAL at 21:40

## 2024-01-23 NOTE — CONSULT NOTE ADULT - SUBJECTIVE AND OBJECTIVE BOX
See below for orthopedic HPI  "78F c/o left knee pain x few months without accident or injury. Pt attributes pain to OA and had right knee replaced last year with Dr. Reece. Pain persists despite conservative measures. Pt ambulates without assistance. Denies numbness, tingling, paresthesias to BLE.    Presents today for a left total knee replacement      Review of Systems:  Review of Systems: musculoskeletal: +left knee pain  Other Review of Systems: All other review of systems negative, except as noted in HPI      Allergies and Intolerances:        Allergies:  	amoxicillin: Drug, Blisters, Pruritus, Hives    Home Medications:   * Patient Currently Takes Medications as of 21-Jan-2023 10:12 documented in Structured Notes  · 	Multiple Vitamins oral tablet: 1 tab(s) orally once a day  · 	bisacodyl 10 mg rectal suppository: 1 suppository(ies) rectal once, As needed, Constipation  · 	melatonin 5 mg oral tablet: 1 tab(s) orally once a day (at bedtime), As needed, Sleep  · 	senna leaf extract oral tablet: 2 tab(s) orally once a day (at bedtime)  · 	pantoprazole 40 mg oral delayed release tablet: 1 tab(s) orally once a day (before a meal)  · 	polyethylene glycol 3350 oral powder for reconstitution: 17 gram(s) orally once a day (at bedtime)  · 	aspirin 81 mg oral delayed release tablet: 1 tab(s) orally 2 times a day x 30 days after surgery   · 	traMADol 50 mg oral tablet: 1 tab(s) orally every 6 hours, As needed, Mild Pain (1 - 3)  · 	oxyCODONE 10 mg oral tablet: 1 tab(s) orally every 4 hours, As needed, Severe Pain (7 - 10)  · 	oxyCODONE 5 mg oral tablet: 1 tab(s) orally every 4 hours, As needed, Moderate Pain (4 - 6)  · 	acetaminophen 325 mg oral tablet: 3 tab(s) orally every 8 hours  · 	celecoxib 200 mg oral capsule: 1 cap(s) orally every 12 hours  · 	propranolol 160 mg oral capsule, extended release: 1 cap(s) orally once a day  · 	hydroCHLOROthiazide 12.5 mg oral tablet: 1 tab(s) orally once a day  · 	irbesartan-hydrochlorothiazide 300 mg-12.5 mg oral tablet: 1 tab(s) orally once a day  · 	escitalopram 10 mg oral tablet: 1 tab(s) orally once a day    Patient History:   Past Medical, Past Surgical, and Family History:  PAST MEDICAL HISTORY:  Anxiety and depression     DM (diabetes mellitus)     GERD (gastroesophageal reflux disease)     HLD (hyperlipidemia)     HTN (hypertension)     Osteoarthritis.     PAST SURGICAL HISTORY:  H/O colonoscopy     History of surgery left fibula    History of surgery right shoulder lipoma    History of surgery pregnancy temination    S/P breast lumpectomy right.    Social History:  · Substance use	No   · Social History (marital status, living situation, occupation, and sexual history)	Denies  "    Vital Signs Last 24 Hrs  T(C): 36.6 (01-23-24 @ 05:50), Max: 36.6 (01-23-24 @ 05:50)  T(F): 97.9 (01-23-24 @ 05:50), Max: 97.9 (01-23-24 @ 05:50)  HR: 58 (01-23-24 @ 05:50) (58 - 58)  BP: 100/60 (01-23-24 @ 05:50) (100/60 - 100/60)  BP(mean): 73 (01-23-24 @ 05:50) (73 - 73)  RR: 18 (01-23-24 @ 05:50) (18 - 18)  SpO2: 94% (01-23-24 @ 05:50) (94% - 94%)    Physical exam  General: nad, sitting up in bed  HEENT: ncat, mmm  cards: rrr, normal S1S2, no mrg  pulm: ctab, no wheeze, crackles, rales or rhonchi  ab: soft, nontender, nondistended, normoactive bowel sounds  neuro: speech is fluent, follows commands, no acute deficits noted

## 2024-01-23 NOTE — PATIENT PROFILE ADULT - FUNCTIONAL ASSESSMENT - BASIC MOBILITY 6.
3-calculated by average/Not able to assess (calculate score using [facility] averaging method)

## 2024-01-23 NOTE — CONSULT NOTE ADULT - ASSESSMENT
78F with PMH of anxiety, depression, GERD, HLD, DM2 and HTN presenting for elective L total knee replacement. Pending LENA placement.     #Post-operative state  - plan per primary team  - bowel regimen  - appreciate PT eval    #GERD  - continue PPI    #HTN  - hold irbesartan and hctz combo pill  - can resume propranolol  - monitor BP while post-op and getting narcotics    #HLD  - continue home statin    #DM2  - blood glucose monitoring, fingersticks  - can resume oral agents on discharge    Moderate level of medical decision making required for this case including the presence of two chronic medical issues (HTN and HLD) and discussion of plan with the orthopedic team.

## 2024-01-23 NOTE — PATIENT PROFILE ADULT - FALL HARM RISK - HARM RISK INTERVENTIONS

## 2024-01-24 ENCOUNTER — TRANSCRIPTION ENCOUNTER (OUTPATIENT)
Age: 79
End: 2024-01-24

## 2024-01-24 LAB
A1C WITH ESTIMATED AVERAGE GLUCOSE RESULT: 5.9 % — HIGH (ref 4–5.6)
ANION GAP SERPL CALC-SCNC: 3 MMOL/L — LOW (ref 5–17)
BUN SERPL-MCNC: 18 MG/DL — SIGNIFICANT CHANGE UP (ref 7–23)
CALCIUM SERPL-MCNC: 9 MG/DL — SIGNIFICANT CHANGE UP (ref 8.4–10.5)
CHLORIDE SERPL-SCNC: 110 MMOL/L — HIGH (ref 96–108)
CO2 SERPL-SCNC: 27 MMOL/L — SIGNIFICANT CHANGE UP (ref 22–31)
CREAT SERPL-MCNC: 0.7 MG/DL — SIGNIFICANT CHANGE UP (ref 0.5–1.3)
EGFR: 90 ML/MIN/1.73M2 — SIGNIFICANT CHANGE UP
ESTIMATED AVERAGE GLUCOSE: 123 MG/DL — HIGH (ref 68–114)
GLUCOSE BLDC GLUCOMTR-MCNC: 106 MG/DL — HIGH (ref 70–99)
GLUCOSE BLDC GLUCOMTR-MCNC: 115 MG/DL — HIGH (ref 70–99)
GLUCOSE BLDC GLUCOMTR-MCNC: 85 MG/DL — SIGNIFICANT CHANGE UP (ref 70–99)
GLUCOSE BLDC GLUCOMTR-MCNC: 91 MG/DL — SIGNIFICANT CHANGE UP (ref 70–99)
GLUCOSE SERPL-MCNC: 92 MG/DL — SIGNIFICANT CHANGE UP (ref 70–99)
HCT VFR BLD CALC: 32.5 % — LOW (ref 34.5–45)
HGB BLD-MCNC: 10.2 G/DL — LOW (ref 11.5–15.5)
MCHC RBC-ENTMCNC: 27.5 PG — SIGNIFICANT CHANGE UP (ref 27–34)
MCHC RBC-ENTMCNC: 31.4 GM/DL — LOW (ref 32–36)
MCV RBC AUTO: 87.6 FL — SIGNIFICANT CHANGE UP (ref 80–100)
NRBC # BLD: 0 /100 WBCS — SIGNIFICANT CHANGE UP (ref 0–0)
PLATELET # BLD AUTO: 265 K/UL — SIGNIFICANT CHANGE UP (ref 150–400)
POTASSIUM SERPL-MCNC: 4.3 MMOL/L — SIGNIFICANT CHANGE UP (ref 3.5–5.3)
POTASSIUM SERPL-SCNC: 4.3 MMOL/L — SIGNIFICANT CHANGE UP (ref 3.5–5.3)
RBC # BLD: 3.71 M/UL — LOW (ref 3.8–5.2)
RBC # FLD: 14.3 % — SIGNIFICANT CHANGE UP (ref 10.3–14.5)
SODIUM SERPL-SCNC: 140 MMOL/L — SIGNIFICANT CHANGE UP (ref 135–145)
WBC # BLD: 12.55 K/UL — HIGH (ref 3.8–10.5)
WBC # FLD AUTO: 12.55 K/UL — HIGH (ref 3.8–10.5)

## 2024-01-24 PROCEDURE — 99232 SBSQ HOSP IP/OBS MODERATE 35: CPT

## 2024-01-24 RX ORDER — BREXPIPRAZOLE 0.25 MG/1
1 TABLET ORAL
Qty: 0 | Refills: 0 | DISCHARGE

## 2024-01-24 RX ORDER — OXYCODONE HYDROCHLORIDE 5 MG/1
1 TABLET ORAL
Qty: 0 | Refills: 0 | DISCHARGE
Start: 2024-01-24

## 2024-01-24 RX ORDER — ZALEPLON 10 MG
1 CAPSULE ORAL
Qty: 0 | Refills: 0 | DISCHARGE
Start: 2024-01-24

## 2024-01-24 RX ORDER — SENNA PLUS 8.6 MG/1
2 TABLET ORAL
Qty: 0 | Refills: 0 | DISCHARGE
Start: 2024-01-24

## 2024-01-24 RX ORDER — ACETAMINOPHEN 500 MG
1000 TABLET ORAL EVERY 8 HOURS
Refills: 0 | Status: DISCONTINUED | OUTPATIENT
Start: 2024-01-24 | End: 2024-01-25

## 2024-01-24 RX ORDER — PANTOPRAZOLE SODIUM 20 MG/1
1 TABLET, DELAYED RELEASE ORAL
Qty: 0 | Refills: 0 | DISCHARGE
Start: 2024-01-24

## 2024-01-24 RX ORDER — ACETAMINOPHEN 500 MG
2 TABLET ORAL
Qty: 0 | Refills: 0 | DISCHARGE
Start: 2024-01-24

## 2024-01-24 RX ORDER — ASPIRIN/CALCIUM CARB/MAGNESIUM 324 MG
1 TABLET ORAL
Qty: 0 | Refills: 0 | DISCHARGE
Start: 2024-01-24

## 2024-01-24 RX ORDER — CELECOXIB 200 MG/1
1 CAPSULE ORAL
Qty: 0 | Refills: 0 | DISCHARGE
Start: 2024-01-24

## 2024-01-24 RX ORDER — POLYETHYLENE GLYCOL 3350 17 G/17G
17 POWDER, FOR SOLUTION ORAL
Qty: 0 | Refills: 0 | DISCHARGE
Start: 2024-01-24

## 2024-01-24 RX ORDER — CELECOXIB 200 MG/1
200 CAPSULE ORAL
Refills: 0 | Status: DISCONTINUED | OUTPATIENT
Start: 2024-01-24 | End: 2024-01-25

## 2024-01-24 RX ADMIN — Medication 650 MILLIGRAM(S): at 12:25

## 2024-01-24 RX ADMIN — Medication 5 MILLIGRAM(S): at 22:10

## 2024-01-24 RX ADMIN — Medication 650 MILLIGRAM(S): at 00:47

## 2024-01-24 RX ADMIN — Medication 1000 MILLIGRAM(S): at 22:32

## 2024-01-24 RX ADMIN — Medication 81 MILLIGRAM(S): at 05:24

## 2024-01-24 RX ADMIN — POLYETHYLENE GLYCOL 3350 17 GRAM(S): 17 POWDER, FOR SOLUTION ORAL at 21:32

## 2024-01-24 RX ADMIN — Medication 650 MILLIGRAM(S): at 06:23

## 2024-01-24 RX ADMIN — PANTOPRAZOLE SODIUM 40 MILLIGRAM(S): 20 TABLET, DELAYED RELEASE ORAL at 06:00

## 2024-01-24 RX ADMIN — CELECOXIB 200 MILLIGRAM(S): 200 CAPSULE ORAL at 18:26

## 2024-01-24 RX ADMIN — Medication 1000 MILLIGRAM(S): at 21:32

## 2024-01-24 RX ADMIN — Medication 81 MILLIGRAM(S): at 18:26

## 2024-01-24 RX ADMIN — ESCITALOPRAM OXALATE 10 MILLIGRAM(S): 10 TABLET, FILM COATED ORAL at 11:10

## 2024-01-24 RX ADMIN — SENNA PLUS 2 TABLET(S): 8.6 TABLET ORAL at 21:32

## 2024-01-24 RX ADMIN — Medication 650 MILLIGRAM(S): at 05:23

## 2024-01-24 RX ADMIN — BREXPIPRAZOLE 0.25 MILLIGRAM(S): 0.25 TABLET ORAL at 21:32

## 2024-01-24 RX ADMIN — Medication 650 MILLIGRAM(S): at 11:09

## 2024-01-24 NOTE — DISCHARGE NOTE PROVIDER - CARE PROVIDER_API CALL
Pankaj Reece  Joint Reconstruction  [address]  [place] [address]  Phone: [phone]  Fax: [phone]  Follow Up Time: 2 weeks

## 2024-01-24 NOTE — DISCHARGE NOTE PROVIDER - NSDCMRMEDTOKEN_GEN_ALL_CORE_FT
acetaminophen 325 mg oral tablet: 3 tab(s) orally every 8 hours  aspirin 81 mg oral delayed release tablet: 1 tab(s) orally 2 times a day x 30 days after surgery   celecoxib 200 mg oral capsule: 1 cap(s) orally every 12 hours  escitalopram 10 mg oral tablet: 1 tab(s) orally once a day  irbesartan-hydrochlorothiazide 300 mg-12.5 mg oral tablet: 1 tab(s) orally once a day  Multiple Vitamins oral tablet: 1 tab(s) orally once a day  pantoprazole 40 mg oral delayed release tablet: 1 tab(s) orally once a day (before a meal)  propranolol 160 mg oral capsule, extended release: 1 cap(s) orally once a day  Rexulti 0.25 mg oral tablet: 1 tab(s) orally   acetaminophen 500 mg oral tablet: 2 tab(s) orally every 8 hours  aspirin 81 mg oral tablet, chewable: 1 tab(s) orally 2 times a day  celecoxib 200 mg oral capsule: 1 cap(s) orally 2 times a day  escitalopram 10 mg oral tablet: 1 tab(s) orally once a day  irbesartan-hydrochlorothiazide 300 mg-12.5 mg oral tablet: 1 tab(s) orally once a day  Multiple Vitamins oral tablet: 1 tab(s) orally once a day  oxyCODONE 5 mg oral tablet: 1 tab(s) orally every 4 hours as needed for Moderate Pain (4 - 6)  pantoprazole 40 mg oral delayed release tablet: 1 tab(s) orally once a day (before a meal)  polyethylene glycol 3350 oral powder for reconstitution: 17 gram(s) orally once a day (at bedtime)  propranolol 160 mg oral capsule, extended release: 1 cap(s) orally once a day  Rexulti 0.25 mg oral tablet: 1 tab(s) orally once a day  senna leaf extract oral tablet: 2 tab(s) orally once a day (at bedtime)  zaleplon 5 mg oral capsule: 1 cap(s) orally once a day (at bedtime) as needed for Insomnia

## 2024-01-24 NOTE — DISCHARGE NOTE PROVIDER - HOSPITAL COURSE
Admitted 1/22/24 to [facility]  Surgery 1/22/24 Left total knee replacement  Amber-op Antibiotics  Pain control  DVT prophylaxis  OOB/Physical Therapy  Inpatient events: stable

## 2024-01-24 NOTE — DISCHARGE NOTE PROVIDER - NSDCFUADDINST_GEN_ALL_CORE_FT
Please follow Dr. Reece’s instruction sheets   ACTIVITY:   - Weight bear as tolerated with assistive device. No strenuous activity, heavy lifting, driving or returning to work until cleared by MD.   - Apply a cold compress to the surgical site several times daily to reduce pain and swelling. For icing, twenty-minute sessions followed by an hour off is recommended. You should ice as frequently as possible. Ice should NEVER be placed directly on the skin. Wearing compression stockings during the first week after surgery can help reduce swelling in your knee, calf and foot, but is not required.      (KNEE REPLACEMENTS)   DO place a pillow under your heel when elevating the leg, to encourage full extension of the knee. Do NOT place a pillow behind your knee for comfort, as this can lead to permanent difficulty straightening your leg. It is normal to develop some swelling in the leg, ankle, and foot because of gravity.     DRESSING/SHOWERING:   (AQUACEL – brown gel dressing)   - You may shower, your dressing is water-resistant. Do not soak in bathtubs. Remove dressing after postop day 7, then leave incision open to air. You may do this yourself (simply peel it off), or you may ask for assistance from your visiting nurse. Keep your incision clean and dry. Do not pick at your incision. Do not apply creams, ointments or oils to your incision until cleared by your surgeon. Do not soak your incision in sitting water (ie tubs, pools, lakes, etc.) until cleared by your surgeon. Do not scrub the incision – instead, allow soap and water to flow over the incision and then pat it dry with a clean towel.     MEDICATION/ANTICOAGULATION:   -You have been prescribed Aspirin 81mg twice daily, as a preventative to help prevent postoperative blood clots. Please take this medication as prescribed.    - You have been prescribed medications for pain:     - Tylenol for mild to moderate pain. Do not exceed 3,000mg daily.     - For more severe pain, take Tylenol with the addition of narcotic pain medication. Take this medication as prescribed. This medication may cause drowsiness or dizziness. Do not operate machinery. This medication may cause constipation.   - For any additional medications, follow instructions on the bottle.    -Try to have regular bowel movements. Take stool softener or laxative if necessary. You may wish to take Miralax daily until you have regular bowel movements.    - If you have been prescribed Aspirin or an anti-inflammatory, please take prilosec (omeprazole) once a day, before breakfast, until no longer taking Aspirin or anti-inflammatory. This will help protect your stomach.   - If you have a pain management physician, please follow-up with them postoperatively.    - If you experience any negative side effects of your medications, please call your surgeon's office to discuss.      FOLLOW-UP:   - You have an appointment with Dr. Reece scheduled on 2/5/24. Please call his office to confirm your appointment.   - Please follow-up with your primary care physician or any other specialist you see postoperatively, if needed.    - Contact your doctor or go to the emergency room if you experience: fever greater than 101.5, chills, chest pain, difficulty breathing, redness or excessive drainage around the incision, other concerns.

## 2024-01-25 ENCOUNTER — TRANSCRIPTION ENCOUNTER (OUTPATIENT)
Age: 79
End: 2024-01-25

## 2024-01-25 VITALS
OXYGEN SATURATION: 94 % | SYSTOLIC BLOOD PRESSURE: 108 MMHG | TEMPERATURE: 98 F | RESPIRATION RATE: 17 BRPM | DIASTOLIC BLOOD PRESSURE: 69 MMHG | HEART RATE: 75 BPM

## 2024-01-25 LAB
ANION GAP SERPL CALC-SCNC: 7 MMOL/L — SIGNIFICANT CHANGE UP (ref 5–17)
BUN SERPL-MCNC: 16 MG/DL — SIGNIFICANT CHANGE UP (ref 7–23)
CALCIUM SERPL-MCNC: 8.7 MG/DL — SIGNIFICANT CHANGE UP (ref 8.4–10.5)
CHLORIDE SERPL-SCNC: 103 MMOL/L — SIGNIFICANT CHANGE UP (ref 96–108)
CO2 SERPL-SCNC: 28 MMOL/L — SIGNIFICANT CHANGE UP (ref 22–31)
CREAT SERPL-MCNC: 0.57 MG/DL — SIGNIFICANT CHANGE UP (ref 0.5–1.3)
EGFR: 94 ML/MIN/1.73M2 — SIGNIFICANT CHANGE UP
GLUCOSE BLDC GLUCOMTR-MCNC: 102 MG/DL — HIGH (ref 70–99)
GLUCOSE BLDC GLUCOMTR-MCNC: 93 MG/DL — SIGNIFICANT CHANGE UP (ref 70–99)
GLUCOSE SERPL-MCNC: 93 MG/DL — SIGNIFICANT CHANGE UP (ref 70–99)
HCT VFR BLD CALC: 31.1 % — LOW (ref 34.5–45)
HGB BLD-MCNC: 9.9 G/DL — LOW (ref 11.5–15.5)
MCHC RBC-ENTMCNC: 27.9 PG — SIGNIFICANT CHANGE UP (ref 27–34)
MCHC RBC-ENTMCNC: 31.8 GM/DL — LOW (ref 32–36)
MCV RBC AUTO: 87.6 FL — SIGNIFICANT CHANGE UP (ref 80–100)
NRBC # BLD: 0 /100 WBCS — SIGNIFICANT CHANGE UP (ref 0–0)
PLATELET # BLD AUTO: 247 K/UL — SIGNIFICANT CHANGE UP (ref 150–400)
POTASSIUM SERPL-MCNC: 3.7 MMOL/L — SIGNIFICANT CHANGE UP (ref 3.5–5.3)
POTASSIUM SERPL-SCNC: 3.7 MMOL/L — SIGNIFICANT CHANGE UP (ref 3.5–5.3)
RBC # BLD: 3.55 M/UL — LOW (ref 3.8–5.2)
RBC # FLD: 14.5 % — SIGNIFICANT CHANGE UP (ref 10.3–14.5)
SODIUM SERPL-SCNC: 138 MMOL/L — SIGNIFICANT CHANGE UP (ref 135–145)
WBC # BLD: 10.98 K/UL — HIGH (ref 3.8–10.5)
WBC # FLD AUTO: 10.98 K/UL — HIGH (ref 3.8–10.5)

## 2024-01-25 PROCEDURE — 83036 HEMOGLOBIN GLYCOSYLATED A1C: CPT

## 2024-01-25 PROCEDURE — C1776: CPT

## 2024-01-25 PROCEDURE — 97110 THERAPEUTIC EXERCISES: CPT

## 2024-01-25 PROCEDURE — 85027 COMPLETE CBC AUTOMATED: CPT

## 2024-01-25 PROCEDURE — 97162 PT EVAL MOD COMPLEX 30 MIN: CPT

## 2024-01-25 PROCEDURE — 97116 GAIT TRAINING THERAPY: CPT

## 2024-01-25 PROCEDURE — 80048 BASIC METABOLIC PNL TOTAL CA: CPT

## 2024-01-25 PROCEDURE — S2900: CPT

## 2024-01-25 PROCEDURE — 99232 SBSQ HOSP IP/OBS MODERATE 35: CPT

## 2024-01-25 PROCEDURE — C1889: CPT

## 2024-01-25 PROCEDURE — 36415 COLL VENOUS BLD VENIPUNCTURE: CPT

## 2024-01-25 PROCEDURE — 82962 GLUCOSE BLOOD TEST: CPT

## 2024-01-25 PROCEDURE — C1713: CPT

## 2024-01-25 PROCEDURE — 73560 X-RAY EXAM OF KNEE 1 OR 2: CPT

## 2024-01-25 RX ADMIN — Medication 1000 MILLIGRAM(S): at 06:05

## 2024-01-25 RX ADMIN — Medication 81 MILLIGRAM(S): at 06:05

## 2024-01-25 RX ADMIN — CELECOXIB 200 MILLIGRAM(S): 200 CAPSULE ORAL at 07:05

## 2024-01-25 RX ADMIN — Medication 1000 MILLIGRAM(S): at 07:05

## 2024-01-25 RX ADMIN — PANTOPRAZOLE SODIUM 40 MILLIGRAM(S): 20 TABLET, DELAYED RELEASE ORAL at 06:05

## 2024-01-25 RX ADMIN — CELECOXIB 200 MILLIGRAM(S): 200 CAPSULE ORAL at 06:05

## 2024-01-25 NOTE — DISCHARGE NOTE NURSING/CASE MANAGEMENT/SOCIAL WORK - NSDCPEFALRISK_GEN_ALL_CORE
For information on Fall & Injury Prevention, visit: [email or web address] OR  [email or web address] OR  https://www.cdc.gov/steadi/patient.html

## 2024-01-25 NOTE — PROGRESS NOTE ADULT - ASSESSMENT
78F with PMH of anxiety, depression, GERD, HLD, DM2 and HTN presenting for elective L total knee replacement. Pending [facility] placement.     #Post-operative state  - plan per primary team  - bowel regimen  - appreciate PT eval - [facility]NA today    #GERD  - continue PPI    #HTN  - hold irbesartan and hctz combo pill - can resume at rehab  - can resume propranolol - slightly hypotensive this morning to High 90s systolic, with normotension a few hours later.    - monitor BP while post-op and getting narcotics    #HLD  - continue home statin    #DM2  - blood glucose monitoring, fingersticks  - can resume oral agents on discharge    Moderate level of medical decision making required for this case including the presence of two chronic medical issues (HTN and HLD) and discussion of plan with the orthopedic team. 
78F with PMH of anxiety, depression, GERD, HLD, DM2 and HTN presenting for elective L total knee replacement. Pending LENA placement.     #Post-operative state  - plan per primary team  - bowel regimen  - appreciate PT eval - [facility] tomorrow    #GERD  - continue PPI    #HTN  - hold irbesartan and hctz combo pill  - can resume propranolol - slightly hypotensive this morning to High 90s systolic, with normotension a few hours later.    - monitor BP while post-op and getting narcotics    #HLD  - continue home statin    #DM2  - blood glucose monitoring, fingersticks  - can resume oral agents on discharge    Moderate level of medical decision making required for this case including the presence of two chronic medical issues (HTN and HLD) and discussion of plan with the orthopedic team.

## 2024-01-25 NOTE — DISCHARGE NOTE NURSING/CASE MANAGEMENT/SOCIAL WORK - PATIENT PORTAL LINK FT
You can access the FollowMyHealth Patient Portal offered by [facility] by registering at the following website: [email or web address] By joining [employer]’s FollowMyHealth portal, you will also be able to view your health information using other applications (apps) compatible with our system.

## 2024-01-25 NOTE — PROGRESS NOTE ADULT - SUBJECTIVE AND OBJECTIVE BOX
Ortho Post Op Check    Procedure:  L TKA   Surgeon: Dr. KHANG Reece    Pt comfortable without complaints, pain controlled  Denies CP, SOB, N/V, numbness/tingling     Vital Signs Last 24 Hrs  T(C): 35.9 (01-22-24 @ 17:40), Max: 37 (01-22-24 @ 15:43)  T(F): 96.6 (01-22-24 @ 17:40), Max: 98.6 (01-22-24 @ 15:43)  HR: 56 (01-22-24 @ 17:40) (56 - 68)  BP: 125/71 (01-22-24 @ 17:40) (115/55 - 125/71)  BP(mean): 89 (01-22-24 @ 17:40) (79 - 89)  RR: 16 (01-22-24 @ 17:40) (14 - 25)  SpO2: 97% (01-22-24 @ 17:40) (96% - 98%)    General: Pt Alert and oriented, NAD  DSG C/D/I  Pulses: 2+ DP  Sensation: SILT grossly SPN/DPN/Saph/Sheeba/Tib  Motor: 5/5 TA/GS/EHL, Quad/Psoas firing limited 2/2 pain    Post-op X-Ray: hardware intact w/o fracture    A/P: 78yFemale s/p L TKA by Dr. KHANG Reece on 01-22  - Stable  - Pain Control  - DVT ppx: ASA   - Post op abx: Ancef  - WBS: WBAT  - PT eval - [facility]    Ortho Pager [phone]
Ortho Note    Patient seen and examined at bedside. Pt states her left knee pain has been improving and slept well overnight. Voiding freely and passing flatus.   Denies CP, SOB, N/V, new numbness/tingling     Vital Signs Last 24 Hrs  T(C): 36.3 (01-24-24 @ 08:26), Max: 36.6 (01-24-24 @ 05:18)  T(F): 97.4 (01-24-24 @ 08:26), Max: 97.8 (01-24-24 @ 05:18)  HR: 59 (01-24-24 @ 08:26) (58 - 59)  BP: 95/61 (01-24-24 @ 08:26) (95/61 - 118/78)  BP(mean): --  RR: 16 (01-24-24 @ 08:26) (16 - 17)  SpO2: 99% (01-24-24 @ 08:26) (91% - 99%)  I&O's Summary    23 Jan 2024 07:01  -  24 Jan 2024 07:00  --------------------------------------------------------  IN: 840 mL / OUT: 1600 mL / NET: -760 mL    24 Jan 2024 07:01  -  24 Jan 2024 09:57  --------------------------------------------------------  IN: 300 mL / OUT: 150 mL / NET: 150 mL        General: Pt Alert and oriented, NAD  DSG C/D/I- Aquacel to left knee  Pulses: 2+ DP pulses palpable bilaterally, skin wwp, cap refill brisk  Sensation: SILT to bilateral lower extremities  Motor: EHL/FHL/TA/GS 5/5 bilaterally                          10.2   12.55 )-----------( 265      ( 24 Jan 2024 06:55 )             32.5     01-24    140  |  110<H>  |  18  ----------------------------<  92  4.3   |  27  |  0.70    Ca    9.0      24 Jan 2024 06:55        A/P: 79yo Female POD#2 s/p Left TKA   - Stable, appreciate medical comanagement   - Pain Control  - OOB/IS  - Bowel Regimen  - DVT ppx: ASA 81mg BID  - PT, WBS: WBAT  - Dispo: [facility]     Ortho Pager [phone]
Ortho Note    Pt comfortable without complaints, pain controlled  Denies CP, SOB, N/V, numbness/tingling     Vital Signs Last 24 Hrs  T(C): 36.6 (01-24-24 @ 05:18), Max: 36.6 (01-24-24 @ 05:18)  T(F): 97.8 (01-24-24 @ 05:18), Max: 97.8 (01-24-24 @ 05:18)  HR: 58 (01-24-24 @ 05:18) (58 - 58)  BP: 118/78 (01-24-24 @ 05:18) (118/78 - 118/78)  BP(mean): --  RR: 17 (01-24-24 @ 05:18) (17 - 17)  SpO2: 91% (01-24-24 @ 05:18) (91% - 91%)  I&O's Summary    22 Jan 2024 07:01  -  23 Jan 2024 07:00  --------------------------------------------------------  IN: 3430 mL / OUT: 1100 mL / NET: 2330 mL    23 Jan 2024 07:01  -  24 Jan 2024 06:12  --------------------------------------------------------  IN: 840 mL / OUT: 1600 mL / NET: -760 mL        General: Pt Alert and oriented, NAD  Prinhannah / aquacel DSG C/D/I  Pulses: 2+ DP/PT b/l  Sensation: SILT b/l  Motor: Quad/Ham/EHL/FHL/TA/GS  5/5                            11.1   16.36 )-----------( 295      ( 23 Jan 2024 05:30 )             33.9     01-23    137  |  102  |  17  ----------------------------<  136<H>  4.4   |  27  |  0.72    Ca    9.5      23 Jan 2024 05:30        A/P: 78yFemale POD#2 s/p  L TKA  - Stable  - Pain Control  - DVT ppx: ASA 81 BID  - PT, WBS: WBAT  - Dispo: pending [facility]    Ortho Pager [phone]
Ortho Note    Subjective:  Pt seen and examined today, plan of care received with language line #[id number]. Patient comfortable without complaints, pain controlled  Denies CP, SOB, N/V, numbness/tingling   Patient advised to review home medications with daughter for dosage, route and frequency. Dosages discussed with patient daughter at bedside        Vital Signs Last 24 Hrs  T(C): 37.1 (01-23-24 @ 08:41), Max: 37.1 (01-23-24 @ 08:41)  T(F): 98.7 (01-23-24 @ 08:41), Max: 98.7 (01-23-24 @ 08:41)  HR: 57 (01-23-24 @ 08:41) (57 - 57)  BP: 101/64 (01-23-24 @ 08:41) (101/64 - 101/64)  BP(mean): --  RR: 17 (01-23-24 @ 08:41) (17 - 17)  SpO2: 92% (01-23-24 @ 08:41) (92% - 92%)  AVSS    Objective:    Physical Exam:  General: Pt Alert and oriented, NAD  Left Knee aquacel DSG C/D/I  Pulses: +2 pedal pulses, DP   Sensation: silt intact bilateral lower extremities  Motor: EHL/FHL/TA/GS - 5/5 bilateral lower extremities        Plan of Care:  A/P: 78yFemale POD#1 s/p Left TKA  - afebrile  - Pain Control- tylenol 650mg PO Q6h, Dilaudid 0.5mg Q4h prn breakthrough pain, Oxycodone 5-10mg PO Q4h prn moderate to severe pain   - DVT ppx: aspirin 81mg PO BID  - PT, WBS: WBAT  - appreciate medicine recs  - ambulate with pt as tolerated  - bowel regimen, Is use, PPI   - Dispo- [facility] pending auth and acceptance    Ortho Pager [phone]
Ortho Note    Pt comfortable without complaints, pain controlled  Denies CP, SOB, N/V, numbness/tingling       Vital Signs Last 24 Hrs  T(C): 36.9 (01-25-24 @ 05:02), Max: 36.9 (01-25-24 @ 05:02)  T(F): 98.4 (01-25-24 @ 05:02), Max: 98.4 (01-25-24 @ 05:02)  HR: 74 (01-25-24 @ 05:02) (74 - 74)  BP: 109/69 (01-25-24 @ 05:02) (109/69 - 109/69)  BP(mean): --  RR: 18 (01-25-24 @ 05:02) (18 - 18)  SpO2: 95% (01-25-24 @ 05:02) (95% - 95%)  I&O's Summary    23 Jan 2024 07:01  -  24 Jan 2024 07:00  --------------------------------------------------------  IN: 840 mL / OUT: 1600 mL / NET: -760 mL    24 Jan 2024 07:01  -  25 Jan 2024 06:25  --------------------------------------------------------  IN: 720 mL / OUT: 550 mL / NET: 170 mL        General: Pt Alert and oriented, NAD  Prinhannah / aquacel DSG C/D/I  Pulses: 2+ DP/PT b/l  Sensation: SILT b/l  Motor: Quad/Ham/EHL/FHL/TA/GS  5/55/5                          10.2   12.55 )-----------( 265      ( 24 Jan 2024 06:55 )             32.5     01-24    140  |  110<H>  |  18  ----------------------------<  92  4.3   |  27  |  0.70    Ca    9.0      24 Jan 2024 06:55        A/P: 78yFemale POD#3 s/p  L TKA  - Stable  - Pain Control  - DVT ppx: ASA 81 BID  - PT, WBS: WBAT  - Dispo: [facility]    Ortho Pager [phone]
Ortho Note    Pt comfortable without complaints, pain controlled  Denies CP, SOB, N/V, numbness/tingling     Vital Signs Last 24 Hrs  T(C): 36.6 (01-23-24 @ 05:50), Max: 36.6 (01-23-24 @ 05:50)  T(F): 97.9 (01-23-24 @ 05:50), Max: 97.9 (01-23-24 @ 05:50)  HR: 58 (01-23-24 @ 05:50) (58 - 58)  BP: 100/60 (01-23-24 @ 05:50) (100/60 - 100/60)  BP(mean): 73 (01-23-24 @ 05:50) (73 - 73)  RR: 18 (01-23-24 @ 05:50) (18 - 18)  SpO2: 94% (01-23-24 @ 05:50) (94% - 94%)  I&O's Summary    22 Jan 2024 07:01  -  23 Jan 2024 07:00  --------------------------------------------------------  IN: 3430 mL / OUT: 1100 mL / NET: 2330 mL        General: Pt Alert and oriented, NAD  Prineo / aquacel DSG C/D/I  Pulses: 2+ DP/PT b/l  Sensation: SILT b/l  Motor: Quad/Ham/EHL/FHL/TA/GS  5/5              A/P: 78yFemale POD#1 s/p  L TKA  - Stable  - Pain Control  - DVT ppx: ASA 81 BID  - PT, WBS: WBAT  - Dispo: [facility]      Ortho Pager [phone]
Ortho Note    Subjective:  Pt seen and examined today, . Patient comfortable without complaints, pain controlled  Denies CP, SOB, N/V, numbness/tingling   Reviewed plan of care with patient at bedside     Vital Signs Last 24 Hrs  T(C): 36.8 (01-25-24 @ 09:24), Max: 36.8 (01-25-24 @ 09:24)  T(F): 98.2 (01-25-24 @ 09:24), Max: 98.2 (01-25-24 @ 09:24)  HR: 75 (01-25-24 @ 09:24) (75 - 75)  BP: 108/69 (01-25-24 @ 09:24) (108/69 - 108/69)  BP(mean): --  RR: 17 (01-25-24 @ 09:24) (17 - 17)  SpO2: 94% (01-25-24 @ 09:24) (94% - 94%)  AVSS    Objective:    Physical Exam:  General: Pt Alert and oriented, NAD  Left Knee aquacel DSG C/D/I  Pulses: +2 pedal pulses, DP   Sensation: silt intact bilateral lower extremities  Motor: EHL/FHL/TA/GS - 5/5 bilateral lower extremities        Plan of Care:  A/P: 78yFemale POD#3 s/p Left TKA  - afebrile  - Pain Control- tylenol 650mg PO Q6h, Dilaudid 0.5mg Q4h prn breakthrough pain, Oxycodone 5-10mg PO Q4h prn moderate to severe pain   - DVT ppx: aspirin 81mg PO BID  - PT, WBS: WBAT  - appreciate medicine recs  - ambulate with pt as tolerated  - bowel regimen, Is use, PPI   - Dispo- [facility] pending auth and acceptance      Ortho Pager [phone]
Doing well today.  Pending discharge to rehab tomorrow.     OVERNIGHT EVENTS: NAEO    SUBJECTIVE / INTERVAL HPI: Patient seen and examined at bedside. Patient denying chest pain, SOB, palpitations, cough. Patient denies fever, chills, HA, Dizziness, change in vision/hearing, N/V, abdominal pain, diarrhea, constipation, hematochezia/melena, dysuria, hematuria, new onset weakness/numbness, LE pain and/or swelling.    Remaining ROS negative       PHYSICAL EXAM:  Physical exam  General: nad, sitting up in bed  HEENT: ncat, mmm  cards: rrr, normal S1S2, no mrg  pulm: ctab, no wheeze, crackles, rales or rhonchi  ab: soft, nontender, nondistended, normoactive bowel sounds  neuro: speech is fluent, follows commands, no acute deficits noted    VITAL SIGNS:  Vital Signs Last 24 Hrs  T(C): 36.3 (24 Jan 2024 08:26), Max: 36.8 (23 Jan 2024 15:52)  T(F): 97.4 (24 Jan 2024 08:26), Max: 98.3 (23 Jan 2024 15:52)  HR: 61 (24 Jan 2024 11:17) (58 - 63)  BP: 113/71 (24 Jan 2024 11:17) (95/61 - 118/78)  BP(mean): --  RR: 17 (24 Jan 2024 11:17) (16 - 17)  SpO2: 96% (24 Jan 2024 11:17) (91% - 99%)    Parameters below as of 24 Jan 2024 11:17  Patient On (Oxygen Delivery Method): room air          MEDICATIONS:  MEDICATIONS  (STANDING):  acetaminophen     Tablet .. 1000 milliGRAM(s) Oral every 8 hours  aspirin  chewable 81 milliGRAM(s) Oral two times a day  brexpiprazole 0.25 milliGRAM(s) Oral daily  celecoxib 200 milliGRAM(s) Oral two times a day  dextrose 5%. 1000 milliLiter(s) (50 mL/Hr) IV Continuous <Continuous>  dextrose 5%. 1000 milliLiter(s) (100 mL/Hr) IV Continuous <Continuous>  dextrose 50% Injectable 12.5 Gram(s) IV Push once  dextrose 50% Injectable 25 Gram(s) IV Push once  dextrose 50% Injectable 25 Gram(s) IV Push once  escitalopram 10 milliGRAM(s) Oral daily  glucagon  Injectable 1 milliGRAM(s) IntraMuscular once  insulin lispro (ADMELOG) corrective regimen sliding scale   SubCutaneous three times a day before meals  lactated ringers. 1000 milliLiter(s) (150 mL/Hr) IV Continuous <Continuous>  pantoprazole    Tablet 40 milliGRAM(s) Oral before breakfast  polyethylene glycol 3350 17 Gram(s) Oral at bedtime  povidone iodine 5% Nasal Swab 1 Application(s) Both Nostrils once  propranolol  milliGRAM(s) Oral daily  senna 2 Tablet(s) Oral at bedtime    MEDICATIONS  (PRN):  dextrose Oral Gel 15 Gram(s) Oral once PRN Blood Glucose LESS THAN 70 milliGRAM(s)/deciliter  HYDROmorphone  Injectable 0.5 milliGRAM(s) IV Push every 15 minutes PRN breakthrough pain  HYDROmorphone  Injectable 0.5 milliGRAM(s) IV Push every 4 hours PRN breakthrough pain  magnesium hydroxide Suspension 30 milliLiter(s) Oral daily PRN Constipation  ondansetron Injectable 4 milliGRAM(s) IV Push every 6 hours PRN Nausea and/or Vomiting  oxyCODONE    IR 5 milliGRAM(s) Oral every 4 hours PRN Moderate Pain (4 - 6)  oxyCODONE    IR 10 milliGRAM(s) Oral every 4 hours PRN Severe Pain (7 - 10)  zaleplon 5 milliGRAM(s) Oral at bedtime PRN Insomnia    ALLERGIES:  Allergies    amoxicillin (Blisters; Pruritus; Hives)    Intolerances    LABS:                        10.2   12.55 )-----------( 265      ( 24 Jan 2024 06:55 )             32.5     01-24    140  |  110<H>  |  18  ----------------------------<  92  4.3   |  27  |  0.70    Ca    9.0      24 Jan 2024 06:55        Urinalysis Basic - ( 24 Jan 2024 06:55 )    Color: x / Appearance: x / SG: x / pH: x  Gluc: 92 mg/dL / Ketone: x  / Bili: x / Urobili: x   Blood: x / Protein: x / Nitrite: x   Leuk Esterase: x / RBC: x / WBC x   Sq Epi: x / Non Sq Epi: x / Bacteria: x      CAPILLARY BLOOD GLUCOSE      POCT Blood Glucose.: 91 mg/dL (24 Jan 2024 11:23)      RADIOLOGY & ADDITIONAL TESTS: Reviewed.
Will be discharged today.  pain controlled.  No shortness of breath or other new constitutional symptoms to report.     Remaining ROS negative       PHYSICAL EXAM:    General: nad, sitting up in bed  HEENT: ncat, mmm  cards: rrr, normal S1S2, no mrg  pulm: ctab, no wheeze, crackles, rales or rhonchi  ab: soft, nontender, nondistended, normoactive bowel sounds  neuro: speech is fluent, follows commands, no acute deficits noted    VITAL SIGNS:  Vital Signs Last 24 Hrs  T(C): 36.8 (25 Jan 2024 09:24), Max: 36.9 (25 Jan 2024 05:02)  T(F): 98.2 (25 Jan 2024 09:24), Max: 98.4 (25 Jan 2024 05:02)  HR: 75 (25 Jan 2024 09:24) (73 - 78)  BP: 108/69 (25 Jan 2024 09:24) (101/66 - 128/74)  BP(mean): --  RR: 17 (25 Jan 2024 09:24) (17 - 18)  SpO2: 94% (25 Jan 2024 09:24) (92% - 95%)    Parameters below as of 25 Jan 2024 09:24  Patient On (Oxygen Delivery Method): room air          MEDICATIONS:  MEDICATIONS  (STANDING):  acetaminophen     Tablet .. 1000 milliGRAM(s) Oral every 8 hours  aspirin  chewable 81 milliGRAM(s) Oral two times a day  brexpiprazole 0.25 milliGRAM(s) Oral daily  celecoxib 200 milliGRAM(s) Oral two times a day  dextrose 5%. 1000 milliLiter(s) (100 mL/Hr) IV Continuous <Continuous>  dextrose 5%. 1000 milliLiter(s) (50 mL/Hr) IV Continuous <Continuous>  dextrose 50% Injectable 12.5 Gram(s) IV Push once  dextrose 50% Injectable 25 Gram(s) IV Push once  dextrose 50% Injectable 25 Gram(s) IV Push once  escitalopram 10 milliGRAM(s) Oral daily  glucagon  Injectable 1 milliGRAM(s) IntraMuscular once  insulin lispro (ADMELOG) corrective regimen sliding scale   SubCutaneous three times a day before meals  lactated ringers. 1000 milliLiter(s) (150 mL/Hr) IV Continuous <Continuous>  pantoprazole    Tablet 40 milliGRAM(s) Oral before breakfast  polyethylene glycol 3350 17 Gram(s) Oral at bedtime  povidone iodine 5% Nasal Swab 1 Application(s) Both Nostrils once  propranolol  milliGRAM(s) Oral daily  senna 2 Tablet(s) Oral at bedtime    MEDICATIONS  (PRN):  dextrose Oral Gel 15 Gram(s) Oral once PRN Blood Glucose LESS THAN 70 milliGRAM(s)/deciliter  HYDROmorphone  Injectable 0.5 milliGRAM(s) IV Push every 15 minutes PRN breakthrough pain  HYDROmorphone  Injectable 0.5 milliGRAM(s) IV Push every 4 hours PRN breakthrough pain  magnesium hydroxide Suspension 30 milliLiter(s) Oral daily PRN Constipation  ondansetron Injectable 4 milliGRAM(s) IV Push every 6 hours PRN Nausea and/or Vomiting  oxyCODONE    IR 5 milliGRAM(s) Oral every 4 hours PRN Moderate Pain (4 - 6)  oxyCODONE    IR 10 milliGRAM(s) Oral every 4 hours PRN Severe Pain (7 - 10)  zaleplon 5 milliGRAM(s) Oral at bedtime PRN Insomnia      ALLERGIES:  Allergies    amoxicillin (Blisters; Pruritus; Hives)    Intolerances        LABS:                        9.9    10.98 )-----------( 247      ( 25 Jan 2024 05:30 )             31.1     01-25    138  |  103  |  16  ----------------------------<  93  3.7   |  28  |  0.57    Ca    8.7      25 Jan 2024 05:30        Urinalysis Basic - ( 25 Jan 2024 05:30 )    Color: x / Appearance: x / SG: x / pH: x  Gluc: 93 mg/dL / Ketone: x  / Bili: x / Urobili: x   Blood: x / Protein: x / Nitrite: x   Leuk Esterase: x / RBC: x / WBC x   Sq Epi: x / Non Sq Epi: x / Bacteria: x      CAPILLARY BLOOD GLUCOSE      POCT Blood Glucose.: 93 mg/dL (25 Jan 2024 11:30)      RADIOLOGY & ADDITIONAL TESTS: Reviewed.

## 2024-01-29 DIAGNOSIS — F32.A DEPRESSION, UNSPECIFIED: ICD-10-CM

## 2024-01-29 DIAGNOSIS — Z96.651 PRESENCE OF RIGHT ARTIFICIAL KNEE JOINT: ICD-10-CM

## 2024-01-29 DIAGNOSIS — Z88.1 ALLERGY STATUS TO OTHER ANTIBIOTIC AGENTS STATUS: ICD-10-CM

## 2024-01-29 DIAGNOSIS — K21.9 GASTRO-ESOPHAGEAL REFLUX DISEASE WITHOUT ESOPHAGITIS: ICD-10-CM

## 2024-01-29 DIAGNOSIS — M17.12 UNILATERAL PRIMARY OSTEOARTHRITIS, LEFT KNEE: ICD-10-CM

## 2024-01-29 DIAGNOSIS — I10 ESSENTIAL (PRIMARY) HYPERTENSION: ICD-10-CM

## 2024-01-29 DIAGNOSIS — Z79.82 LONG TERM (CURRENT) USE OF ASPIRIN: ICD-10-CM

## 2024-01-29 DIAGNOSIS — F41.9 ANXIETY DISORDER, UNSPECIFIED: ICD-10-CM

## 2024-01-29 DIAGNOSIS — E78.5 HYPERLIPIDEMIA, UNSPECIFIED: ICD-10-CM

## 2024-01-29 DIAGNOSIS — E11.9 TYPE 2 DIABETES MELLITUS WITHOUT COMPLICATIONS: ICD-10-CM

## 2024-02-05 ENCOUNTER — APPOINTMENT (OUTPATIENT)
Dept: ORTHOPEDIC SURGERY | Facility: CLINIC | Age: 79
End: 2024-02-05
Payer: MEDICARE

## 2024-02-05 VITALS
HEART RATE: 72 BPM | BODY MASS INDEX: 31.41 KG/M2 | WEIGHT: 184 LBS | DIASTOLIC BLOOD PRESSURE: 83 MMHG | SYSTOLIC BLOOD PRESSURE: 120 MMHG | OXYGEN SATURATION: 93 % | HEIGHT: 64 IN

## 2024-02-05 DIAGNOSIS — Z47.1 AFTERCARE FOLLOWING JOINT REPLACEMENT SURGERY: ICD-10-CM

## 2024-02-05 DIAGNOSIS — Z96.652 AFTERCARE FOLLOWING JOINT REPLACEMENT SURGERY: ICD-10-CM

## 2024-02-05 PROCEDURE — 99024 POSTOP FOLLOW-UP VISIT: CPT

## 2024-02-08 NOTE — HISTORY OF PRESENT ILLNESS
[de-identified] : 1st post op- LT TKR- January 22, 2024 [de-identified] : 79 y/o female 2 weeks s/ p LTKR doing well. Rehabbing at [facility] & planning to be discharged in about 5 days. Presenting without any assistive devices demonstrating a cautious gait with the assistance of her son. Denies any fever, chills or drainage from surgical site. Taking Tylenol and Celebrex prn for pain.  [de-identified] : General appearance: well nourished and hydrated, pleasant, alert and oriented x 3, cooperative.   HEENT: normocephalic, EOM intact, wearing mask, external auditory canal clear.   Cardiovascular: no lower leg edema, no varicosities, dorsalis pedis pulses palpable and symmetric.   Lymphatics: no palpable lymphadenopathy, no lymphedema.   Neurologic: sensation is normal, no muscle weakness in upper or lower extremities, patella tendon reflexes present and symmetric.   Dermatologic: skin moist, warm, no rash.   Spine: cervical spine with normal lordosis and painless range of motion, thoracic spine with normal kyphosis and painless range of motion, lumbosacral spine with normal lordosis and painless range of motion.  No tenderness to palpation along midline spine and paraspinal musculature.  Sacroiliac joints nontender bilaterally. Negative SLR and crossed SLR tests bilaterally. Gait: cautious w/o assistive device  Left knee: all fields negative/normal/unremarkable unless otherwise noted --  - Focal soft tissue swelling:  - Ecchymosis: nml - Erythema: nml - Effusion: nml - Wounds: Well healed anterior surgical postion - Alignment: nml - Tenderness: LJL - ROM: 3-90 - Collateral laxity: stable - Cruciate laxity:  stable - Popliteal angle (degrees):  - Quad strength: 5/5   [de-identified] : 2 weeks s/p LTKR [de-identified] : - Continue Tylenol & Aspirin - PT & HEP provided - F/U in 4 weeks with Left knee XRs

## 2024-03-11 ENCOUNTER — APPOINTMENT (OUTPATIENT)
Dept: ORTHOPEDIC SURGERY | Facility: CLINIC | Age: 79
End: 2024-03-11

## 2024-03-25 PROBLEM — E11.9 TYPE 2 DIABETES MELLITUS WITHOUT COMPLICATIONS: Chronic | Status: ACTIVE | Noted: 2023-01-18

## 2024-06-18 ENCOUNTER — APPOINTMENT (OUTPATIENT)
Dept: ORTHOPEDIC SURGERY | Facility: CLINIC | Age: 79
End: 2024-06-18
Payer: MEDICARE

## 2024-06-18 DIAGNOSIS — M19.011 PRIMARY OSTEOARTHRITIS, RIGHT SHOULDER: ICD-10-CM

## 2024-06-18 DIAGNOSIS — S46.011A STRAIN OF MUSCLE(S) AND TENDON(S) OF THE ROTATOR CUFF OF RIGHT SHOULDER, INITIAL ENCOUNTER: ICD-10-CM

## 2024-06-18 PROCEDURE — 20610 DRAIN/INJ JOINT/BURSA W/O US: CPT | Mod: 50

## 2024-06-18 PROCEDURE — 99214 OFFICE O/P EST MOD 30 MIN: CPT | Mod: 25

## 2024-06-18 NOTE — PHYSICAL EXAM
[de-identified] : Physical exam demonstrates the patient to be alert and oriented x 3 and capable of ambulation. The patient is well-developed and well-nourished in no apparent respiratory distress. The majority of the skin is intact bilaterally in the upper extremities without any bilateral elbow lymphadenopathy.  There is diminished bilateral shoulder range of motion and difficulty with active abduction and forward flexion.  Crepitus is present with passive and active bilateral shoulder range of motion.  Well-healed right lateral shoulder scar.  There is good capillary refill of the digits bilaterally. Sensation is intact to light touch bilaterally. alneuro

## 2024-06-18 NOTE — PROCEDURE
[FreeTextEntry1] : My impression is that the patient has bilateral glenohumeral joint osteoarthritis and rotator cuff tear. We discussed treatment options and she elected to undergo a bilateral glenohumeral joint injection. The risks, benefits, and alternatives were discussed with the patient. This included, but was not limited to nerve injury, infection, subcutaneous atrophy, skin depigmentation etc. Under informed consent and sterile conditions the glenohumeral joint was injected using a spinal needle placed from lateral to medial, just underneath the acromion process.  A combination of 3 cc of Marcaine, 3 cc of lidocaine and 1 cc of Kenalog was used. It is my hopes that this significantly alleviates the patients symptoms.

## 2024-06-18 NOTE — HISTORY OF PRESENT ILLNESS
[FreeTextEntry1] : 78-year-old female, history of bilateral shoulder arthritis and rotator cuff tears, presenting to the office for evaluation of bilateral shoulder pain.  The patient reports crepitus and loss of motion in both shoulders, left worse than right.

## 2024-06-18 NOTE — ASSESSMENT
[FreeTextEntry1] : My impression is that the patient has bilateral glenohumeral joint arthritis and rotator cuff arthropathy.  Initial treatment options were discussed including therapy and corticosteroid injection for symptom relief.  Bilateral shoulder injections were administered today, as requested by the patient.  I explained that therapy may be considered but surgical intervention should be explored, to help definitively address her bilateral degenerative shoulder issues.  I issued the patient a referral to my colleague, Dr. Mendoza, for definitive treatment.

## 2024-08-17 NOTE — PATIENT PROFILE ADULT - PHONE #
Had 1/2 edible gummy around 10-10:30 pm and now feels weak and shaky. Awake and alert. No increased WOB. VUTD. Follows with neuro for tingling and numbness to extremities. D stick 190 en route. PMH: migraines, hernia
[phone]

## 2025-01-10 NOTE — REASON FOR VISIT
[Initial Visit] : an initial visit for [Other: ____] : [unfilled] pt is AOx3, ambulatory with steady gait. pt presents to the ED tonight with complaints of sore throat/difficulty swallowing, fever that occurred yesterday, headache, bilateral ear pain since yesterday. pt denies recent travel or sick contacts. pt is able to speak in full complete sentences without difficulties, respirations are even and unlabored. pt rates her pain a 10/10, denies taking medication PTA in the ED. denies any CP, SOB, N/V/D. pt has pmh of cardiac stent, HTN, HLD

## 2025-01-16 ENCOUNTER — APPOINTMENT (OUTPATIENT)
Dept: ORTHOPEDIC SURGERY | Facility: CLINIC | Age: 80
End: 2025-01-16
Payer: MEDICARE

## 2025-01-16 VITALS — BODY MASS INDEX: 31.41 KG/M2 | HEIGHT: 64 IN | WEIGHT: 184 LBS

## 2025-01-16 DIAGNOSIS — M19.011 PRIMARY OSTEOARTHRITIS, RIGHT SHOULDER: ICD-10-CM

## 2025-01-16 DIAGNOSIS — M19.012 PRIMARY OSTEOARTHRITIS, LEFT SHOULDER: ICD-10-CM

## 2025-01-16 PROCEDURE — 73030 X-RAY EXAM OF SHOULDER: CPT | Mod: 50

## 2025-01-16 PROCEDURE — 99214 OFFICE O/P EST MOD 30 MIN: CPT

## 2025-01-25 ENCOUNTER — APPOINTMENT (OUTPATIENT)
Dept: CT IMAGING | Facility: CLINIC | Age: 80
End: 2025-01-25
Payer: MEDICARE

## 2025-01-25 ENCOUNTER — RESULT REVIEW (OUTPATIENT)
Age: 80
End: 2025-01-25

## 2025-01-25 ENCOUNTER — OUTPATIENT (OUTPATIENT)
Dept: OUTPATIENT SERVICES | Facility: HOSPITAL | Age: 80
LOS: 1 days | End: 2025-01-25

## 2025-01-25 DIAGNOSIS — Z98.890 OTHER SPECIFIED POSTPROCEDURAL STATES: Chronic | ICD-10-CM

## 2025-01-25 PROCEDURE — 73200 CT UPPER EXTREMITY W/O DYE: CPT | Mod: 26,RT

## 2025-01-25 PROCEDURE — 76376 3D RENDER W/INTRP POSTPROCES: CPT | Mod: 26

## 2025-01-31 VITALS
RESPIRATION RATE: 16 BRPM | WEIGHT: 176.37 LBS | SYSTOLIC BLOOD PRESSURE: 144 MMHG | HEART RATE: 73 BPM | DIASTOLIC BLOOD PRESSURE: 81 MMHG | TEMPERATURE: 97 F | HEIGHT: 59 IN | OXYGEN SATURATION: 96 %

## 2025-01-31 DIAGNOSIS — M19.011 PRIMARY OSTEOARTHRITIS, RIGHT SHOULDER: ICD-10-CM

## 2025-01-31 DIAGNOSIS — S46.011A STRAIN OF MUSCLE(S) AND TENDON(S) OF THE ROTATOR CUFF OF RIGHT SHOULDER, INITIAL ENCOUNTER: ICD-10-CM

## 2025-01-31 RX ORDER — ACETAMINOPHEN 500 MG/1
500 TABLET ORAL 4 TIMES DAILY
Qty: 112 | Refills: 0 | Status: ACTIVE | COMMUNITY
Start: 2025-01-31 | End: 1900-01-01

## 2025-01-31 RX ORDER — CELECOXIB 200 MG/1
200 CAPSULE ORAL
Qty: 30 | Refills: 0 | Status: ACTIVE | COMMUNITY
Start: 2025-01-31 | End: 1900-01-01

## 2025-01-31 RX ORDER — OXYCODONE 5 MG/1
5 TABLET ORAL EVERY 6 HOURS
Qty: 20 | Refills: 0 | Status: ACTIVE | COMMUNITY
Start: 2025-01-31 | End: 1900-01-01

## 2025-01-31 NOTE — H&P ADULT - HISTORY OF PRESENT ILLNESS
78yo female with right shoulder pain x    ASES: 10    Presents today for elective Right reverse total shoulder replacement with Dr. Mendoza Per outpatient review, 80yo female with right shoulder pain x 4 years. "She noted the gradual onset of pain and stiffness in both shoulders 4 years ago without an injury. She has performed physical therapy in the past which did not help her and currently takes no medicines for pain. She had a cortisone injection 6 months ago that gave her no relief of pain and pain has progressed to the point where she has pain at night that interrupts her sleep and difficulty raising the arm for activities of daily living." She is right hand dominant. Patient has tried and failed conservative treatment of right shoulder pain.    ASES: 10    Presents today for elective Right reverse total shoulder replacement with Dr. Mendoza

## 2025-01-31 NOTE — H&P ADULT - NSHPLABSRESULTS_GEN_ALL_CORE
Preop CBC, BMP, PT/PTT/INR, UA within normal limits- reviewed by medical clearance.  Cr: 0.72  H/H: 11.8/37.5  Preop EKG: NSR, reviewed by medical clearance.   DOS

## 2025-01-31 NOTE — H&P ADULT - NSHPPHYSICALEXAM_GEN_ALL_CORE
Gen: Alert and oriented, NAD  MSK: Decreased ROM to right shoulder 2/2 pain     Remainder of PE per medical clearance note Gen: NAD  MSK: Decreased ROM to right shoulder 2/2 pain   Skin without erythema, ecchymosis, abrasions, signs of infection, well healed horizontal scar about 3 inches over right shoulder (scar from liposuction per pt)  Sensation intact to light touch bilateral upper extremities  Pulses: RP2+ bilaterally, brisk capillary refill  AIN/PIN/ulnar/median/radial intact bilaterally      Remainder of PE per medical clearance note

## 2025-01-31 NOTE — ASU PATIENT PROFILE, ADULT - NSICDXPASTSURGICALHX_GEN_ALL_CORE_FT
PAST SURGICAL HISTORY:  H/O colonoscopy     History of surgery left fibula    History of surgery right shoulder lipoma    History of surgery pregnancy temination    S/P breast lumpectomy right    S/P knee replacement left     PAST SURGICAL HISTORY:  H/O colonoscopy     History of surgery left fibula    History of surgery right shoulder lipoma    History of surgery pregnancy temination    S/P breast lumpectomy right    S/P knee replacement b/l

## 2025-01-31 NOTE — H&P ADULT - NSICDXPASTSURGICALHX_GEN_ALL_CORE_FT
PAST SURGICAL HISTORY:  H/O colonoscopy     History of surgery left fibula    History of surgery right shoulder lipoma    History of surgery pregnancy temination    S/P breast lumpectomy right     PAST SURGICAL HISTORY:  H/O colonoscopy     History of surgery left fibula    History of surgery right shoulder lipoma    History of surgery pregnancy temination    S/P breast lumpectomy right    S/P knee replacement b/l

## 2025-02-03 ENCOUNTER — TRANSCRIPTION ENCOUNTER (OUTPATIENT)
Age: 80
End: 2025-02-03

## 2025-02-03 ENCOUNTER — APPOINTMENT (OUTPATIENT)
Dept: ORTHOPEDIC SURGERY | Facility: HOSPITAL | Age: 80
End: 2025-02-03

## 2025-02-03 ENCOUNTER — INPATIENT (INPATIENT)
Facility: HOSPITAL | Age: 80
LOS: 0 days | Discharge: HOME HEALTH | End: 2025-02-04
Attending: ORTHOPAEDIC SURGERY | Admitting: ORTHOPAEDIC SURGERY
Payer: MEDICARE

## 2025-02-03 DIAGNOSIS — E11.9 TYPE 2 DIABETES MELLITUS WITHOUT COMPLICATIONS: ICD-10-CM

## 2025-02-03 DIAGNOSIS — Z98.890 OTHER SPECIFIED POSTPROCEDURAL STATES: Chronic | ICD-10-CM

## 2025-02-03 DIAGNOSIS — K21.9 GASTRO-ESOPHAGEAL REFLUX DISEASE WITHOUT ESOPHAGITIS: ICD-10-CM

## 2025-02-03 DIAGNOSIS — M19.90 UNSPECIFIED OSTEOARTHRITIS, UNSPECIFIED SITE: ICD-10-CM

## 2025-02-03 DIAGNOSIS — E78.5 HYPERLIPIDEMIA, UNSPECIFIED: ICD-10-CM

## 2025-02-03 DIAGNOSIS — Z96.659 PRESENCE OF UNSPECIFIED ARTIFICIAL KNEE JOINT: Chronic | ICD-10-CM

## 2025-02-03 DIAGNOSIS — I10 ESSENTIAL (PRIMARY) HYPERTENSION: ICD-10-CM

## 2025-02-03 DIAGNOSIS — F41.9 ANXIETY DISORDER, UNSPECIFIED: ICD-10-CM

## 2025-02-03 PROCEDURE — 23472 RECONSTRUCT SHOULDER JOINT: CPT | Mod: RT

## 2025-02-03 DEVICE — K-WIRE EXACTECH (TREADED) SHORT 3.2MM: Type: IMPLANTABLE DEVICE | Site: RIGHT | Status: FUNCTIONAL

## 2025-02-03 DEVICE — IMP EQUINOXE REV GLENOSPHERE SM 36MM: Type: IMPLANTABLE DEVICE | Site: RIGHT | Status: FUNCTIONAL

## 2025-02-03 DEVICE — STEM HUM EQUINOXE PRESS FIT 7MM: Type: IMPLANTABLE DEVICE | Site: RIGHT | Status: FUNCTIONAL

## 2025-02-03 DEVICE — K-WIRE EXACTECH TROCAR TIP 3.2MM: Type: IMPLANTABLE DEVICE | Site: RIGHT | Status: FUNCTIONAL

## 2025-02-03 DEVICE — SCREW LOKG COMP REV 4.5X18MM: Type: IMPLANTABLE DEVICE | Site: RIGHT | Status: FUNCTIONAL

## 2025-02-03 DEVICE — PLATE GLENOID EQUINOXE REVERSE SM: Type: IMPLANTABLE DEVICE | Site: RIGHT | Status: FUNCTIONAL

## 2025-02-03 DEVICE — TRAY HUM REV ADPTER PLUS 0MM: Type: IMPLANTABLE DEVICE | Site: RIGHT | Status: FUNCTIONAL

## 2025-02-03 DEVICE — IMPLANTABLE DEVICE: Type: IMPLANTABLE DEVICE | Site: RIGHT | Status: FUNCTIONAL

## 2025-02-03 DEVICE — SCREW LOKG COMP REV 4.5X26MM: Type: IMPLANTABLE DEVICE | Site: RIGHT | Status: FUNCTIONAL

## 2025-02-03 DEVICE — SCREW LOKG REV EQ: Type: IMPLANTABLE DEVICE | Site: RIGHT | Status: FUNCTIONAL

## 2025-02-03 DEVICE — KIT SCREW TORQUE DEF: Type: IMPLANTABLE DEVICE | Site: RIGHT | Status: FUNCTIONAL

## 2025-02-03 RX ORDER — SENNOSIDES 8.6 MG
2 TABLET ORAL AT BEDTIME
Refills: 0 | Status: DISCONTINUED | OUTPATIENT
Start: 2025-02-03 | End: 2025-02-04

## 2025-02-03 RX ORDER — CELECOXIB 200 MG
200 CAPSULE ORAL ONCE
Refills: 0 | Status: COMPLETED | OUTPATIENT
Start: 2025-02-03 | End: 2025-02-03

## 2025-02-03 RX ORDER — HYDROMORPHONE HYDROCHLORIDE 4 MG/ML
0.5 INJECTION, SOLUTION INTRAMUSCULAR; INTRAVENOUS; SUBCUTANEOUS ONCE
Refills: 0 | Status: DISCONTINUED | OUTPATIENT
Start: 2025-02-03 | End: 2025-02-04

## 2025-02-03 RX ORDER — OXYCODONE HYDROCHLORIDE 30 MG/1
10 TABLET ORAL
Refills: 0 | Status: DISCONTINUED | OUTPATIENT
Start: 2025-02-03 | End: 2025-02-04

## 2025-02-03 RX ORDER — POLYETHYLENE GLYCOL 3350 17 G/17G
17 POWDER, FOR SOLUTION ORAL AT BEDTIME
Refills: 0 | Status: DISCONTINUED | OUTPATIENT
Start: 2025-02-03 | End: 2025-02-04

## 2025-02-03 RX ORDER — OXYCODONE HYDROCHLORIDE 30 MG/1
5 TABLET ORAL
Refills: 0 | Status: DISCONTINUED | OUTPATIENT
Start: 2025-02-03 | End: 2025-02-04

## 2025-02-03 RX ORDER — ARIPIPRAZOLE 5 MG/1
2 TABLET ORAL DAILY
Refills: 0 | Status: DISCONTINUED | OUTPATIENT
Start: 2025-02-03 | End: 2025-02-04

## 2025-02-03 RX ORDER — ACETAMINOPHEN 160 MG/5ML
650 SUSPENSION ORAL EVERY 6 HOURS
Refills: 0 | Status: DISCONTINUED | OUTPATIENT
Start: 2025-02-03 | End: 2025-02-04

## 2025-02-03 RX ORDER — ARIPIPRAZOLE 5 MG/1
1 TABLET ORAL
Refills: 0 | DISCHARGE

## 2025-02-03 RX ORDER — DM/PSEUDOEPHED/ACETAMINOPHEN 10-30-250
12.5 CAPSULE ORAL ONCE
Refills: 0 | Status: DISCONTINUED | OUTPATIENT
Start: 2025-02-03 | End: 2025-02-04

## 2025-02-03 RX ORDER — INSULIN LISPRO 100/ML
VIAL (ML) SUBCUTANEOUS
Refills: 0 | Status: DISCONTINUED | OUTPATIENT
Start: 2025-02-03 | End: 2025-02-04

## 2025-02-03 RX ORDER — ESCITALOPRAM 10 MG/1
2 TABLET, FILM COATED ORAL
Refills: 0 | DISCHARGE

## 2025-02-03 RX ORDER — PANTOPRAZOLE 20 MG/1
40 TABLET, DELAYED RELEASE ORAL
Refills: 0 | Status: DISCONTINUED | OUTPATIENT
Start: 2025-02-03 | End: 2025-02-04

## 2025-02-03 RX ORDER — DM/PSEUDOEPHED/ACETAMINOPHEN 10-30-250
25 CAPSULE ORAL ONCE
Refills: 0 | Status: DISCONTINUED | OUTPATIENT
Start: 2025-02-03 | End: 2025-02-04

## 2025-02-03 RX ORDER — SODIUM CHLORIDE 9 G/ML
1000 INJECTION, SOLUTION INTRAVENOUS
Refills: 0 | Status: DISCONTINUED | OUTPATIENT
Start: 2025-02-03 | End: 2025-02-04

## 2025-02-03 RX ORDER — CEFAZOLIN SODIUM IN 0.9 % NACL 2 G/10 ML
2000 SYRINGE (ML) INTRAVENOUS EVERY 8 HOURS
Refills: 0 | Status: COMPLETED | OUTPATIENT
Start: 2025-02-03 | End: 2025-02-04

## 2025-02-03 RX ORDER — GLUCAGON 3 MG/1
1 POWDER NASAL ONCE
Refills: 0 | Status: DISCONTINUED | OUTPATIENT
Start: 2025-02-03 | End: 2025-02-04

## 2025-02-03 RX ORDER — DM/PSEUDOEPHED/ACETAMINOPHEN 10-30-250
15 CAPSULE ORAL ONCE
Refills: 0 | Status: DISCONTINUED | OUTPATIENT
Start: 2025-02-03 | End: 2025-02-04

## 2025-02-03 RX ORDER — ESCITALOPRAM 10 MG/1
10 TABLET, FILM COATED ORAL DAILY
Refills: 0 | Status: DISCONTINUED | OUTPATIENT
Start: 2025-02-03 | End: 2025-02-04

## 2025-02-03 RX ORDER — PROCHLORPERAZINE MALEATE 5 MG/1
5 TABLET ORAL ONCE
Refills: 0 | Status: DISCONTINUED | OUTPATIENT
Start: 2025-02-03 | End: 2025-02-04

## 2025-02-03 RX ORDER — CELECOXIB 200 MG
200 CAPSULE ORAL EVERY 12 HOURS
Refills: 0 | Status: DISCONTINUED | OUTPATIENT
Start: 2025-02-03 | End: 2025-02-04

## 2025-02-03 RX ORDER — BUPIVACAINE 13.3 MG/ML
20 INJECTION, SUSPENSION, LIPOSOMAL INFILTRATION ONCE
Refills: 0 | Status: DISCONTINUED | OUTPATIENT
Start: 2025-02-03 | End: 2025-02-04

## 2025-02-03 RX ORDER — ACETAMINOPHEN 160 MG/5ML
1000 SUSPENSION ORAL ONCE
Refills: 0 | Status: COMPLETED | OUTPATIENT
Start: 2025-02-03 | End: 2025-02-03

## 2025-02-03 RX ADMIN — Medication 2 TABLET(S): at 21:23

## 2025-02-03 RX ADMIN — ACETAMINOPHEN 650 MILLIGRAM(S): 160 SUSPENSION ORAL at 22:10

## 2025-02-03 RX ADMIN — ARIPIPRAZOLE 2 MILLIGRAM(S): 5 TABLET ORAL at 21:23

## 2025-02-03 RX ADMIN — Medication 200 MILLIGRAM(S): at 16:21

## 2025-02-03 RX ADMIN — Medication 100 MILLIGRAM(S): at 16:20

## 2025-02-03 RX ADMIN — ACETAMINOPHEN 1000 MILLIGRAM(S): 160 SUSPENSION ORAL at 09:41

## 2025-02-03 RX ADMIN — POLYETHYLENE GLYCOL 3350 17 GRAM(S): 17 POWDER, FOR SOLUTION ORAL at 21:23

## 2025-02-03 RX ADMIN — Medication 2: at 22:36

## 2025-02-03 RX ADMIN — Medication 200 MILLIGRAM(S): at 09:41

## 2025-02-03 RX ADMIN — ACETAMINOPHEN 650 MILLIGRAM(S): 160 SUSPENSION ORAL at 16:20

## 2025-02-03 NOTE — ANESTHESIA FOLLOW-UP NOTE - NSRECOMMEND_GEN_ALL_CORE
None Fel[facility]  Vascular Surgery  [address]  [place] [address]  Phone: [phone]  Fax: [phone]  Follow Up Time:

## 2025-02-04 ENCOUNTER — TRANSCRIPTION ENCOUNTER (OUTPATIENT)
Age: 80
End: 2025-02-04

## 2025-02-04 VITALS
TEMPERATURE: 98 F | RESPIRATION RATE: 16 BRPM | OXYGEN SATURATION: 95 % | HEART RATE: 60 BPM | SYSTOLIC BLOOD PRESSURE: 104 MMHG | DIASTOLIC BLOOD PRESSURE: 62 MMHG

## 2025-02-04 LAB
A1C WITH ESTIMATED AVERAGE GLUCOSE RESULT: 6 % — HIGH (ref 4–5.6)
ANION GAP SERPL CALC-SCNC: 10 MMOL/L — SIGNIFICANT CHANGE UP (ref 5–17)
BUN SERPL-MCNC: 20 MG/DL — SIGNIFICANT CHANGE UP (ref 7–23)
CALCIUM SERPL-MCNC: 8.7 MG/DL — SIGNIFICANT CHANGE UP (ref 8.4–10.5)
CHLORIDE SERPL-SCNC: 103 MMOL/L — SIGNIFICANT CHANGE UP (ref 96–108)
CO2 SERPL-SCNC: 24 MMOL/L — SIGNIFICANT CHANGE UP (ref 22–31)
CREAT SERPL-MCNC: 0.69 MG/DL — SIGNIFICANT CHANGE UP (ref 0.5–1.3)
EGFR: 89 ML/MIN/1.73M2 — SIGNIFICANT CHANGE UP
ESTIMATED AVERAGE GLUCOSE: 126 MG/DL — HIGH (ref 68–114)
GLUCOSE SERPL-MCNC: 115 MG/DL — HIGH (ref 70–99)
HCT VFR BLD CALC: 28.2 % — LOW (ref 34.5–45)
HGB BLD-MCNC: 9 G/DL — LOW (ref 11.5–15.5)
MCHC RBC-ENTMCNC: 27.7 PG — SIGNIFICANT CHANGE UP (ref 27–34)
MCHC RBC-ENTMCNC: 31.9 G/DL — LOW (ref 32–36)
MCV RBC AUTO: 86.8 FL — SIGNIFICANT CHANGE UP (ref 80–100)
NRBC # BLD: 0 /100 WBCS — SIGNIFICANT CHANGE UP (ref 0–0)
NRBC BLD-RTO: 0 /100 WBCS — SIGNIFICANT CHANGE UP (ref 0–0)
PLATELET # BLD AUTO: 298 K/UL — SIGNIFICANT CHANGE UP (ref 150–400)
POTASSIUM SERPL-MCNC: 4.4 MMOL/L — SIGNIFICANT CHANGE UP (ref 3.5–5.3)
POTASSIUM SERPL-SCNC: 4.4 MMOL/L — SIGNIFICANT CHANGE UP (ref 3.5–5.3)
RBC # BLD: 3.25 M/UL — LOW (ref 3.8–5.2)
RBC # FLD: 15 % — HIGH (ref 10.3–14.5)
SODIUM SERPL-SCNC: 137 MMOL/L — SIGNIFICANT CHANGE UP (ref 135–145)
WBC # BLD: 13.17 K/UL — HIGH (ref 3.8–10.5)
WBC # FLD AUTO: 13.17 K/UL — HIGH (ref 3.8–10.5)

## 2025-02-04 PROCEDURE — C1713: CPT

## 2025-02-04 PROCEDURE — 83036 HEMOGLOBIN GLYCOSYLATED A1C: CPT

## 2025-02-04 PROCEDURE — 36415 COLL VENOUS BLD VENIPUNCTURE: CPT

## 2025-02-04 PROCEDURE — C1776: CPT

## 2025-02-04 PROCEDURE — 76000 FLUOROSCOPY <1 HR PHYS/QHP: CPT

## 2025-02-04 PROCEDURE — 82962 GLUCOSE BLOOD TEST: CPT

## 2025-02-04 PROCEDURE — 80048 BASIC METABOLIC PNL TOTAL CA: CPT

## 2025-02-04 PROCEDURE — 23472 RECONSTRUCT SHOULDER JOINT: CPT

## 2025-02-04 PROCEDURE — 85027 COMPLETE CBC AUTOMATED: CPT

## 2025-02-04 RX ADMIN — Medication 200 MILLIGRAM(S): at 05:38

## 2025-02-04 RX ADMIN — PANTOPRAZOLE 40 MILLIGRAM(S): 20 TABLET, DELAYED RELEASE ORAL at 06:58

## 2025-02-04 RX ADMIN — Medication 100 MILLIGRAM(S): at 01:22

## 2025-02-04 RX ADMIN — ACETAMINOPHEN 650 MILLIGRAM(S): 160 SUSPENSION ORAL at 05:37

## 2025-02-04 NOTE — PROGRESS NOTE ADULT - SUBJECTIVE AND OBJECTIVE BOX
Ortho Progress Note     Procedure: Right rTSA  Surgeon: Dr. Mendoza    Patient Luxembourger speaking, translation provided by OR RN   Pt comfortable without complaints, pain controlled  Denies CP, SOB, N/V, numbness/tingling     Vital Signs Last 24 Hrs  T(C): 36.1 (02-03-25 @ 13:04), Max: 36.2 (02-03-25 @ 09:56)  T(F): 97 (02-03-25 @ 13:04), Max: 97 (02-03-25 @ 13:04)  HR: 60 (02-03-25 @ 13:43) (60 - 74)  BP: 98/54 (02-03-25 @ 13:43) (83/53 - 144/81)  BP(mean): 70 (02-03-25 @ 13:43) (62 - 82)  RR: 16 (02-03-25 @ 13:43) (16 - 20)  SpO2: 94% (02-03-25 @ 13:43) (94% - 98%)  I&O's Summary      General: Pt Alert and oriented, NAD  DSG C/D/I - Aquacel to right shoulder  Pulses: 2+ Radial pulses palpable bilaterally, skin wwp, cap refill brisk  Sensation: SILT to bilateral distal upper extremities m/u/r/ax distribution   Motor: Radial/Ulnar/Median nerves intact bilaterally,  5/5 bilaterally, 5/5 EPL/FPL/1st dorsal interosseous                   A/P: 79yFemale POD#1 s/p Right rTSA  - Stable  - Pain Control  - DVT ppx: SCDs  - Post op abx: Ancef  - PT, WBS: NWB to RUE  - dispo Home today     Ortho Pager [phone]
Ortho Post Op Check    Procedure: Right rTSA  Surgeon: Dr. Mendoza    Patient Armenian speaking, translation provided by OR RN  Patient seen and examined in PACU bed. Pt comfortable without complaints, pain controlled  Denies CP, SOB, N/V, numbness/tingling     Vital Signs Last 24 Hrs  T(C): 36.1 (02-03-25 @ 13:04), Max: 36.2 (02-03-25 @ 09:56)  T(F): 97 (02-03-25 @ 13:04), Max: 97 (02-03-25 @ 13:04)  HR: 60 (02-03-25 @ 13:43) (60 - 74)  BP: 98/54 (02-03-25 @ 13:43) (83/53 - 144/81)  BP(mean): 70 (02-03-25 @ 13:43) (62 - 82)  RR: 16 (02-03-25 @ 13:43) (16 - 20)  SpO2: 94% (02-03-25 @ 13:43) (94% - 98%)  I&O's Summary      General: Pt Alert and oriented, NAD  DSG C/D/I - Aquacel to right shoulder  Pulses: 2+ Radial pulses palpable bilaterally, skin wwp, cap refill brisk  Sensation: SILT to bilateral distal upper extremities  Motor: Radial/Ulnar/Median nerves intact bilaterally,  5/5 bilaterally                  A/P: 79yFemale POD#0 s/p Right rTSA  - Stable  - Pain Control  - DVT ppx: SCDs  - Post op abx: Ancef  - PT, WBS: NWB to JULIANA    Ortho Pager [phone]

## 2025-02-04 NOTE — DISCHARGE NOTE PROVIDER - HOSPITAL COURSE
Admitted 2/3/25  Surgery- right rTSA  Amber-op Antibiotics  Pain control  DVT prophylaxis  OOB/ ambulation

## 2025-02-04 NOTE — DISCHARGE NOTE NURSING/CASE MANAGEMENT/SOCIAL WORK - PATIENT PORTAL LINK FT
[phone] ([place])   Pt advised per dr.spore note
Pt is having nausea,she believe its a possible kidney stone. Pt also has been having back pain and abdominal pain   Pt urine is  cloudy and has some blood. It started 1 week ago but she thought she had just  pulled a muscle  but didn't really realize what was going on.     Pls advise
She could go to Urgent Care, or Emergency Room to check for what is going on. She may need Xray, urine and blood tests.
You can access the FollowMyHealth Patient Portal offered by [facility] by registering at the following website: [email or web address]. By joining [employer]’s FollowMyHealth portal, you will also be able to view your health information using other applications (apps) compatible with our system.

## 2025-02-04 NOTE — DISCHARGE NOTE PROVIDER - CARE PROVIDER_API CALL
Jose Mendoza Mike  Orthopaedic Surgery  [address] [place] [address], [address]  [place] [address]  Phone: [phone]  Fax: [phone]  Follow Up Time: 2 weeks

## 2025-02-04 NOTE — DISCHARGE NOTE PROVIDER - NSDCCPCAREPLAN_GEN_ALL_CORE_FT
PRINCIPAL DISCHARGE DIAGNOSIS  Diagnosis: Osteoarthritis  Assessment and Plan of Treatment: right rTSA

## 2025-02-04 NOTE — DISCHARGE NOTE PROVIDER - NSDCFUADDINST_GEN_ALL_CORE_FT
Please see Dr. Mendoza's separate discharge instructions sheet.  Take medications as prescribed by Dr. Mendoza.    ACTIVITY:     - Non weight-bearing with right upper extremity. No strenuous activity, heavy lifting, driving or returning to work until cleared by MD.     - Apply a cold compress to the surgical site several times daily to reduce pain and swelling. For icing, twenty-minute sessions followed by an hour off is recommended. You should ice as frequently as possible. Ice should NEVER be placed directly on the skin.       MEDICATION:      - Tylenol for mild to moderate pain. Do not exceed 3,000mg daily.       - For more severe pain, take Tylenol with the addition of narcotic pain medication. Take this medication as prescribed. This medication may cause drowsiness or dizziness. Do not operate machinery. This medication may cause constipation.     - For any additional medications, follow instructions on the bottle.      -Try to have regular bowel movements. Take stool softener or laxative if necessary. You may wish to take Miralax daily until you have regular bowel movements.          - If you have a pain management physician, please follow-up with them postoperatively.      - If you experience any negative side effects of your medications, please call your surgeon's office to discuss.           FOLLOW-UP:     - Call to schedule an appt with  *** for follow up.     - Please follow-up with your primary care physician or any other specialist you see postoperatively, if needed.      - Contact your doctor or go to the emergency room if you experience: fever greater than 101.5, chills, chest pain, difficulty breathing, redness or excessive drainage around the incision, other concerns.

## 2025-02-04 NOTE — DISCHARGE NOTE PROVIDER - NSDCMRMEDTOKEN_GEN_ALL_CORE_FT
acetaminophen 500 mg oral tablet: 2 tab(s) orally every 8 hours  ARIPiprazole 2 mg oral tablet: 1 tab(s) orally once a day (at bedtime)  aspirin 81 mg oral tablet, chewable: 1 tab(s) orally 2 times a day  celecoxib 200 mg oral capsule: 1 cap(s) orally 2 times a day  escitalopram 5 mg oral tablet: 2 tab(s) orally once a day  irbesartan-hydrochlorothiazide 300 mg-12.5 mg oral tablet: 1 tab(s) orally once a day Hold for blood pressure less than 110  pantoprazole 40 mg oral delayed release tablet: 1 tab(s) orally once a day (before a meal)  propranolol 160 mg oral capsule, extended release: 1 cap(s) orally once a day (at bedtime)

## 2025-02-04 NOTE — DISCHARGE NOTE NURSING/CASE MANAGEMENT/SOCIAL WORK - NSDCPEFALRISK_GEN_ALL_CORE
For information on Fall & Injury Prevention, visit: [email or web address] OR  [email or web address] OR  https://www.cdc.gov/steadi/patient.html

## 2025-02-04 NOTE — DISCHARGE NOTE NURSING/CASE MANAGEMENT/SOCIAL WORK - FINANCIAL ASSISTANCE
[facility] provides services at a reduced cost to those who are determined to be eligible through [facility]’s financial assistance program. Information regarding [facility]’s financial assistance program can be found by going to [email or web address] or by calling [phone].

## 2025-02-10 DIAGNOSIS — M19.011 PRIMARY OSTEOARTHRITIS, RIGHT SHOULDER: ICD-10-CM

## 2025-02-10 DIAGNOSIS — K21.9 GASTRO-ESOPHAGEAL REFLUX DISEASE WITHOUT ESOPHAGITIS: ICD-10-CM

## 2025-02-10 DIAGNOSIS — E11.9 TYPE 2 DIABETES MELLITUS WITHOUT COMPLICATIONS: ICD-10-CM

## 2025-02-10 DIAGNOSIS — I10 ESSENTIAL (PRIMARY) HYPERTENSION: ICD-10-CM

## 2025-02-10 DIAGNOSIS — Z96.653 PRESENCE OF ARTIFICIAL KNEE JOINT, BILATERAL: ICD-10-CM

## 2025-02-10 DIAGNOSIS — Z79.82 LONG TERM (CURRENT) USE OF ASPIRIN: ICD-10-CM

## 2025-02-10 DIAGNOSIS — M25.711 OSTEOPHYTE, RIGHT SHOULDER: ICD-10-CM

## 2025-02-10 DIAGNOSIS — F41.8 OTHER SPECIFIED ANXIETY DISORDERS: ICD-10-CM

## 2025-02-10 DIAGNOSIS — E78.5 HYPERLIPIDEMIA, UNSPECIFIED: ICD-10-CM

## 2025-02-10 DIAGNOSIS — Z88.1 ALLERGY STATUS TO OTHER ANTIBIOTIC AGENTS: ICD-10-CM

## 2025-02-20 ENCOUNTER — APPOINTMENT (OUTPATIENT)
Dept: ORTHOPEDIC SURGERY | Facility: CLINIC | Age: 80
End: 2025-02-20
Payer: MEDICARE

## 2025-02-20 VITALS — WEIGHT: 194 LBS | HEIGHT: 64 IN | BODY MASS INDEX: 33.12 KG/M2

## 2025-02-20 DIAGNOSIS — M19.011 PRIMARY OSTEOARTHRITIS, RIGHT SHOULDER: ICD-10-CM

## 2025-02-20 PROCEDURE — 73030 X-RAY EXAM OF SHOULDER: CPT | Mod: RT

## 2025-02-20 PROCEDURE — 99024 POSTOP FOLLOW-UP VISIT: CPT

## 2025-04-03 ENCOUNTER — APPOINTMENT (OUTPATIENT)
Dept: ORTHOPEDIC SURGERY | Facility: CLINIC | Age: 80
End: 2025-04-03
Payer: MEDICARE

## 2025-04-03 VITALS — HEIGHT: 64 IN | WEIGHT: 194 LBS | BODY MASS INDEX: 33.12 KG/M2

## 2025-04-03 DIAGNOSIS — Z96.611 PRESENCE OF RIGHT ARTIFICIAL SHOULDER JOINT: ICD-10-CM

## 2025-04-03 PROCEDURE — 99024 POSTOP FOLLOW-UP VISIT: CPT

## 2025-07-03 ENCOUNTER — APPOINTMENT (OUTPATIENT)
Dept: ORTHOPEDIC SURGERY | Facility: CLINIC | Age: 80
End: 2025-07-03
Payer: MEDICARE

## 2025-07-03 VITALS — BODY MASS INDEX: 17.75 KG/M2 | WEIGHT: 104 LBS | HEIGHT: 64 IN

## 2025-07-03 PROCEDURE — 99212 OFFICE O/P EST SF 10 MIN: CPT

## 2025-09-04 ENCOUNTER — APPOINTMENT (OUTPATIENT)
Dept: ORTHOPEDIC SURGERY | Facility: CLINIC | Age: 80
End: 2025-09-04

## (undated) DEVICE — WOUND IRR IRRISEPT W 0.5 CHG

## (undated) DEVICE — STRYKER 4-PORT MANIFOLD W/SPECIMEN COLLECTION

## (undated) DEVICE — DRSG COMBINE 5 X 9"

## (undated) DEVICE — ELCTR BOVIE PENCIL BLADE 10FT

## (undated) DEVICE — PREP CHLORAPREP HI-LITE ORANGE 26ML

## (undated) DEVICE — DRAPE 1/2 SHEET 40X57"

## (undated) DEVICE — SUT FIBERWIRE #2 38" STRAND 1 BLUE T-5 TAPER

## (undated) DEVICE — VENODYNE/SCD SLEEVE CALF MEDIUM

## (undated) DEVICE — SAW BLADE STRYKER SAGITTAL DUAL CUT TEETH 35X64X.64MM

## (undated) DEVICE — NDL 18G BLUNT FILL PINK

## (undated) DEVICE — DRSG GAUZE MOISTURIZER 0.5 OZ 4X8

## (undated) DEVICE — SYR LUER LOK 50CC

## (undated) DEVICE — SUT STRATAFIX SYMMETRIC PDS 1 45CM OS-6

## (undated) DEVICE — SUCTION YANKAUER NO CONTROL VENT

## (undated) DEVICE — GLV 7.5 PROTEXIS ORTHO (CREAM)

## (undated) DEVICE — DRSG AQUACEL 3.5 X 6"

## (undated) DEVICE — SYR ASEPTO

## (undated) DEVICE — DRAPE STERI-DRAPE INCISE 32X33"

## (undated) DEVICE — SUT VICRYL 2-0 27" CT-1 UNDYED

## (undated) DEVICE — SUT STRATAFIX SPIRAL PDO 1 30CM OS-6

## (undated) DEVICE — ROSA NAVITRACKER KIT KNEE/SPINE

## (undated) DEVICE — DRSG COBAN 6"

## (undated) DEVICE — SUT VICRYL 0 36" CT-1 UNDYED

## (undated) DEVICE — SUT STRATAFIX SPIRAL MONOCRYL PLUS 3-0 30CM PS-1 UNDYED

## (undated) DEVICE — SAW BLADE STRYKER SAGITTAL 12.5X73X0.64MM

## (undated) DEVICE — GLV 7 PROTEXIS (WHITE)

## (undated) DEVICE — SYR TOOMEY 50ML

## (undated) DEVICE — ELCTR AQUAMANTYS BIPOLAR SEALER 6.0

## (undated) DEVICE — BLADE SCALPEL SAFETYLOCK #15

## (undated) DEVICE — SUT NDL MAYO CATGUT 1/2 CIRCLE TAPER POINT 0.050" X 1.248"

## (undated) DEVICE — SUT PROLENE 3-0 18" PS-1

## (undated) DEVICE — DRSG ACE BANDAGE 6"

## (undated) DEVICE — PACK TOTAL OPEN SHOULDER

## (undated) DEVICE — DRAPE 3/4 SHEET 52X76"

## (undated) DEVICE — MARKING PEN W RULER

## (undated) DEVICE — WOUND IRR SURGIPHOR

## (undated) DEVICE — PREP BETADINE SPONGE STICKS

## (undated) DEVICE — WARMING BLANKET UPPER ADULT

## (undated) DEVICE — PACK TOTAL KNEE

## (undated) DEVICE — SYR CONTROL LUER LOK 10CC

## (undated) DEVICE — SAW BLADE STRYKER SAGITTAL 81.5X12.5X1.19MM

## (undated) DEVICE — DRSG KERLIX ROLL LG 4.5"

## (undated) DEVICE — DRSG AQUACEL 3.5 X 10"

## (undated) DEVICE — GLV 7.5 PROTEXIS (WHITE)

## (undated) DEVICE — DRAPE INSTRUMENT POUCH 6.75" X 11"

## (undated) DEVICE — POSITIONER FOAM EGG CRATE ULNAR 2PCS (PINK)

## (undated) DEVICE — NDL HYPO SAFE 22G X 1.5" (BLACK)

## (undated) DEVICE — DRAPE MAGNETIC INSTRUMENT MEDIUM

## (undated) DEVICE — DRAPE LIGHT HANDLE COVER (BLUE)

## (undated) DEVICE — HOOD T5 PEELAWAY

## (undated) DEVICE — POSITIONER UNIVERSAL HEAD RESTRAINT DISP

## (undated) DEVICE — SYR LUER LOK 30CC

## (undated) DEVICE — DRSG DERMABOND PRINEO 22CM

## (undated) DEVICE — SAW BLADE STRYKER SAGITTAL 25X86.5X1.32MM

## (undated) DEVICE — DRAPE C ARM MINI

## (undated) DEVICE — DRSG STOCKINETTE IMPERVIOUS XL

## (undated) DEVICE — DRAPE TOWEL BLUE 17" X 24"

## (undated) DEVICE — DRSG STERISTRIPS 0.5 X 4"

## (undated) DEVICE — SPECIMEN CONTAINER 4OZ

## (undated) DEVICE — POLISHER OR CAUTERY TIP

## (undated) DEVICE — DRAPE U POLY BLUE 60X72"

## (undated) DEVICE — WARMING BLANKET LOWER ADULT

## (undated) DEVICE — POSITIONING ARTHREX TRIMANO BEACH CHAIR

## (undated) DEVICE — SUT FIBERWIRE #2 38" STRAND 1 BLUE 1 WHITE/BLACK

## (undated) DEVICE — DRAPE MAYO STAND 30"

## (undated) DEVICE — ROSA DRAPE ROBOTIC UNIT

## (undated) DEVICE — SUT PROLENE 4-0 18" PS-2

## (undated) DEVICE — DRAPE MAYO STAND 23"

## (undated) DEVICE — SUT STRATAFIX SPIRAL PDO 0 24CM CP-2

## (undated) DEVICE — ELCTR BOVIE PENCIL HANDPIECE ROCKER SWITCH 15FT

## (undated) DEVICE — TOURNIQUET ESMARK 6"

## (undated) DEVICE — ELCTR STRYKER NEPTUNE SMOKE EVACUATION PENCIL (GREEN)

## (undated) DEVICE — SUT ETHILON 3-0 18" PS-1

## (undated) DEVICE — PACK ORTHO SHOULDER OPEN LF

## (undated) DEVICE — DRAPE U (BLUE) 60 X 72"

## (undated) DEVICE — DRAPE U (CLEAR) 47 X 51"

## (undated) DEVICE — GLV 8 PROTEXIS (WHITE)

## (undated) DEVICE — SUT ETHIBOND EXCEL 2 30" OS-4

## (undated) DEVICE — SUT VICRYL 2-0 27" SH UNDYED

## (undated) DEVICE — NDL HYPO SAFE 18G X 1.5" (PINK)

## (undated) DEVICE — SURGIPHOR STERILE WOUND IRR POVIDONE IODINE

## (undated) DEVICE — GLV 7 PROTEXIS ORTHO (CREAM)

## (undated) DEVICE — DRILL BIT EXACTECH ERGO 3.2MM

## (undated) DEVICE — SUT VICRYL 2-0 27" CT-1

## (undated) DEVICE — ZIMMER MIXING BOWL WITH SPATULA